# Patient Record
Sex: MALE | Race: WHITE | Employment: FULL TIME | URBAN - METROPOLITAN AREA
[De-identification: names, ages, dates, MRNs, and addresses within clinical notes are randomized per-mention and may not be internally consistent; named-entity substitution may affect disease eponyms.]

---

## 2017-02-08 DIAGNOSIS — E78.5 HYPERLIPIDEMIA, UNSPECIFIED HYPERLIPIDEMIA TYPE: ICD-10-CM

## 2017-02-09 RX ORDER — SIMVASTATIN 20 MG/1
TABLET, FILM COATED ORAL
Qty: 90 TAB | Refills: 0 | Status: SHIPPED | OUTPATIENT
Start: 2017-02-09 | End: 2017-04-26 | Stop reason: SDUPTHER

## 2017-02-09 NOTE — TELEPHONE ENCOUNTER
From: Mo Helton  To:  Devon Madsen MD  Sent: 2/8/2017 5:03 PM EST  Subject: Medication Renewal Request    Original authorizing provider: MD Mo Oh would like a refill of the following medications:  simvastatin (ZOCOR) 20 mg tablet Devon Madsen MD]    Preferred pharmacy: [address] [place] [address]    Comment:

## 2017-04-26 ENCOUNTER — OFFICE VISIT (OUTPATIENT)
Dept: FAMILY MEDICINE CLINIC | Age: 52
End: 2017-04-26

## 2017-04-26 VITALS
TEMPERATURE: 98.5 F | HEART RATE: 80 BPM | DIASTOLIC BLOOD PRESSURE: 78 MMHG | SYSTOLIC BLOOD PRESSURE: 114 MMHG | HEIGHT: 69 IN | WEIGHT: 225.8 LBS | OXYGEN SATURATION: 98 % | BODY MASS INDEX: 33.44 KG/M2 | RESPIRATION RATE: 18 BRPM

## 2017-04-26 DIAGNOSIS — J30.1 SEASONAL ALLERGIC RHINITIS DUE TO POLLEN: ICD-10-CM

## 2017-04-26 DIAGNOSIS — E78.5 HYPERLIPIDEMIA, UNSPECIFIED HYPERLIPIDEMIA TYPE: ICD-10-CM

## 2017-04-26 DIAGNOSIS — E55.9 VITAMIN D DEFICIENCY: ICD-10-CM

## 2017-04-26 DIAGNOSIS — K21.9 GASTROESOPHAGEAL REFLUX DISEASE WITHOUT ESOPHAGITIS: ICD-10-CM

## 2017-04-26 DIAGNOSIS — J02.9 PHARYNGITIS, UNSPECIFIED ETIOLOGY: Primary | ICD-10-CM

## 2017-04-26 RX ORDER — SIMVASTATIN 20 MG/1
TABLET, FILM COATED ORAL
Qty: 90 TAB | Refills: 1 | Status: SHIPPED | OUTPATIENT
Start: 2017-04-26 | End: 2017-11-20 | Stop reason: SDUPTHER

## 2017-04-26 NOTE — PROGRESS NOTES
Chief Complaint   Patient presents with    Cholesterol Problem     fasting today     1. Have you been to the ER, urgent care clinic since your last visit? Hospitalized since your last visit? No    2. Have you seen or consulted any other health care providers outside of the [address] [place] [address] since your last visit? Include any pap smears or colon screening.  No

## 2017-04-26 NOTE — PROGRESS NOTES
HISTORY OF PRESENT ILLNESS  HPI  Emil Souza is a 46 y.o. Male with history of vitamin D deficiency and hyperlipidemia who presents to office today for fasting follow-up. Pt complains of a sore throat after waking up for the past 3-4 months. He notes that he will also occasionally wake up with a dry mouth. Pt states that he had problems with allergies in the spring 3-4 years ago but denies other incidences of allergies. Pt complains of heartburn up to 2-3 times a week that is most common after lying down in bed and is aggravated by eating close to bedtime. Past Medical History:   Diagnosis Date    Other and unspecified hyperlipidemia 11/01/2010    Other and unspecified hyperlipidemia 11/1/2010     Past Surgical History:   Procedure Laterality Date    HX OTHER SURGICAL  1998    anal fissure     Current Outpatient Prescriptions on File Prior to Visit   Medication Sig Dispense Refill    multivitamin, stress formula (STRESS TAB) tablet Take 1 Tab by mouth daily.  omega-3 fatty acids-vitamin e (FISH OIL) 1,000 mg Cap Take 4 Caps by mouth.  cholecalciferol, vitamin D3, (VITAMIN D3) 2,000 unit Tab Take 8,000 Units by mouth. No current facility-administered medications on file prior to visit.       No Known Allergies  Family History   Problem Relation Age of Onset    Cancer Mother      Social History     Social History    Marital status: UNKNOWN     Spouse name: N/A    Number of children: N/A    Years of education: N/A     Social History Main Topics    Smoking status: Never Smoker    Smokeless tobacco: Never Used    Alcohol use Yes    Drug use: No    Sexual activity: Not Asked     Other Topics Concern     Service Yes    Blood Transfusions No    Caffeine Concern No    Occupational Exposure No    Hobby Hazards No    Sleep Concern No    Stress Concern No    Weight Concern No    Special Diet No    Back Care No    Exercise Yes    Bike Helmet Not Asked     na    Seat Belt Yes    Self-Exams No     Social History Narrative             Review of Systems   Constitutional: Negative for chills, diaphoresis, fever, malaise/fatigue and weight loss. HENT: Positive for congestion and sore throat (morning). Eyes: Negative for blurred vision, double vision, pain and redness. Respiratory: Negative for cough, shortness of breath and wheezing. Cardiovascular: Negative for chest pain, palpitations, orthopnea, claudication, leg swelling and PND. Gastrointestinal: Positive for heartburn. Skin: Negative for itching and rash. Neurological: Negative for dizziness, tingling, tremors, sensory change, speech change, focal weakness, seizures, loss of consciousness, weakness and headaches. Results for orders placed or performed in visit on 04/26/17   LIPID PANEL   Result Value Ref Range    Cholesterol, total 182 100 - 199 mg/dL    Triglyceride 107 0 - 149 mg/dL    HDL Cholesterol 50 >39 mg/dL    VLDL, calculated 21 5 - 40 mg/dL    LDL, calculated 111 (H) 0 - 99 mg/dL   METABOLIC PANEL, COMPREHENSIVE   Result Value Ref Range    Glucose 103 (H) 65 - 99 mg/dL    BUN 21 6 - 24 mg/dL    Creatinine 1.03 0.76 - 1.27 mg/dL    GFR est non-AA 84 >59 mL/min/1.73    GFR est AA 97 >59 mL/min/1.73    BUN/Creatinine ratio 20 9 - 20    Sodium 142 134 - 144 mmol/L    Potassium 4.5 3.5 - 5.2 mmol/L    Chloride 105 96 - 106 mmol/L    CO2 20 18 - 29 mmol/L    Calcium 9.0 8.7 - 10.2 mg/dL    Protein, total 7.1 6.0 - 8.5 g/dL    Albumin 4.6 3.5 - 5.5 g/dL    GLOBULIN, TOTAL 2.5 1.5 - 4.5 g/dL    A-G Ratio 1.8 1.2 - 2.2    Bilirubin, total 0.4 0.0 - 1.2 mg/dL    Alk.  phosphatase 56 39 - 117 IU/L    AST (SGOT) 14 0 - 40 IU/L    ALT (SGPT) 31 0 - 44 IU/L   CVD REPORT   Result Value Ref Range    INTERPRETATION Note              Physical Exam  Visit Vitals    /78 (BP 1 Location: Right arm, BP Patient Position: Sitting)    Pulse 80    Temp 98.5 °F (36.9 °C) (Oral)    Resp 18    Ht 5' 8.5\" (1.74 m)  Wt 225 lb 12.8 oz (102.4 kg)    SpO2 98%    BMI 33.83 kg/m2     Head: Normocephalic, without obvious abnormality, atraumatic  Eyes: conjunctivae/corneas clear. PERRL, EOM's intact. Ears: normal TM's and external ear canals AU  Nose: Nares normal. Septum midline. mild to moderate nasal congestion, a little bit of watery drainage but no thick drainage  Throat: Lips, mucosa, and tongue normal. Teeth and gums normal  Neck: supple, symmetrical, trachea midline, no adenopathy, thyroid: not enlarged, symmetric, no tenderness/mass/nodules, no carotid bruit and no JVD  Lungs: clear to auscultation bilaterally  Heart: regular rate and rhythm, S1, S2 normal, no murmur, click, rub or gallop  Extremities: extremities normal, atraumatic, no cyanosis or edema  Pulses: 2+ and symmetric  Lymph nodes: Cervical, supraclavicular, and axillary nodes normal.  Neurologic: Grossly normal            ASSESSMENT and PLAN    ICD-10-CM ICD-9-CM    1. Pharyngitis, unspecified etiology- Am for months- reflux vs allergy- 4/2017 J02.9 462    2. Hyperlipidemia, unspecified hyperlipidemia type E78.5 272.4 simvastatin (ZOCOR) 20 mg tablet      LIPID PANEL      METABOLIC PANEL, COMPREHENSIVE      MO HANDLG&/OR CONVEY OF SPEC FOR TR OFFICE TO LAB      MO COLLECTION VENOUS BLOOD,VENIPUNCTURE   3. Vitamin D deficiency E55.9 268.9    4. Seasonal allergic rhinitis due to pollen J30.1 477.0    5. Gastroesophageal reflux disease without esophagitis- mainly at bedtime K21.9 530.81      Candivenus Peralta was seen today for cholesterol problem.     Diagnoses and all orders for this visit:    Pharyngitis, unspecified etiology- Am for months- reflux vs allergy- 4/2017    Hyperlipidemia, unspecified hyperlipidemia type  -     simvastatin (ZOCOR) 20 mg tablet; TAKE 1 TABLET BY MOUTH NIGHTLY  -     LIPID PANEL  -     METABOLIC PANEL, COMPREHENSIVE  -     MO HANDLG&/OR CONVEY OF SPEC FOR TR OFFICE TO LAB  -     MO COLLECTION VENOUS BLOOD,VENIPUNCTURE    Vitamin D deficiency    Seasonal allergic rhinitis due to pollen    Gastroesophageal reflux disease without esophagitis- mainly at bedtime    Other orders  -     CVD REPORT      Follow-up Disposition:  Return in about 6 months (around 10/26/2017), or if Am sore throat symptoms worsen or fail to improve, for F/U cholesterol, f/u Vitamin D deficiency. lab results and schedule of future lab studies reviewed with patient  reviewed diet, exercise and weight control  cardiovascular risk and specific lipid/LDL goals reviewed  reviewed medications and side effects in detail  Please call my office if there are any questions- [phone]. I will arrange for follow up after the lab tests done from today return  Recommended a weekly \"heart check. \" I went into detail how to do this. Call for refills on medications as needed. Discussed expected course/resolution/complications of diagnosis in detail with patient. Medication risks/benefits/costs/interactions/alternatives discussed with patient. Pt was given an after visit summary which includes diagnoses, current medications & vitals. Pt expressed understanding with the diagnosis and plan. Total 25 minutes,60 % counseling re: I reviewed the importance of doing a weekly \"heart check\" and how to do that; he apparently is doing this most of the time. I talked briefly about the cardiac risk calculation, and we'll calculate that for him as well. He's aware of his weight going up and that he needs to work on that. Reviewed symptoms, or lack thereof, of elevated cholesterol. I suggested that he give a trial of Claritin or Allegra daily for three weeks; if his sore throat gets better it means it's most likely from an allergy, and if it doesn't improve he's probably having reflux causing his sore throat.  I informed him that patients with reflux don't always have the typical heartburn or burping; they can just have a sore throat or a cough, so if his sore throat does appear to be coming from reflux, he needs to start treating that if his symptom is happening more than twice a week. I mentioned Pepcid as a treatment and preventative, whereas taking Tums after the symptom is not preventative. Also, discussed symptoms of concern that were noted today in the note above, treatment options( including doing nothing), when to follow up before recommended time frame. Also, answered all questions. This document was written by Vianca Lopes, as dictated by Tera Cochran MD.  I have reviewed and agree with the above note and have made corrections where appropriate Len Nelson M.D.

## 2017-04-26 NOTE — PATIENT INSTRUCTIONS

## 2017-04-26 NOTE — MR AVS SNAPSHOT
Visit Information Date & Time Provider Department Dept. Phone Encounter #  
 4/26/2017  8:30 AM Humaira Olmos  [facility] [phone] [id number] Upcoming Health Maintenance Date Due INFLUENZA AGE 9 TO ADULT 8/1/2016 DTaP/Tdap/Td series (2 - Td) 10/27/2020 COLONOSCOPY 12/17/2020 Allergies as of 4/26/2017  Review Complete On: 4/26/2017 By: Karmen Iniguez LPN No Known Allergies Current Immunizations  Never Reviewed Name Date Influenza Vaccine 9/6/2015 TDAP Vaccine 10/27/2010 Not reviewed this visit You Were Diagnosed With   
  
 Codes Comments Hyperlipidemia, unspecified hyperlipidemia type     ICD-10-CM: E78.5 ICD-9-CM: 272.4 Vitals BP Pulse Temp Resp Height(growth percentile) Weight(growth percentile) 114/78 (BP 1 Location: Right arm, BP Patient Position: Sitting) 80 98.5 °F (36.9 °C) (Oral) 18 5' 8.5\" (1.74 m) 225 lb 12.8 oz (102.4 kg) SpO2 BMI Smoking Status 98% 33.83 kg/m2 Never Smoker Vitals History BMI and BSA Data Body Mass Index Body Surface Area  
 33.83 kg/m 2 2.22 m 2 Preferred Pharmacy Pharmacy Name Phone [address] [place] [address], [facility] Magalis Sutherland [phone] Your Updated Medication List  
  
   
This list is accurate as of: 4/26/17  9:29 AM.  Always use your most recent med list.  
  
  
  
  
 FISH OIL 1,000 mg Cap Generic drug:  omega-3 fatty acids-vitamin e Take 4 Caps by mouth.  
  
 multivitamin, stress formula tablet Commonly known as:  STRESS TAB Take 1 Tab by mouth daily. simvastatin 20 mg tablet Commonly known as:  ZOCOR  
TAKE 1 TABLET BY MOUTH NIGHTLY  
  
 VITAMIN D3 2,000 unit Tab Generic drug:  cholecalciferol (vitamin D3) Take 8,000 Units by mouth. Prescriptions Sent to Pharmacy Refills  
 simvastatin (ZOCOR) 20 mg tablet 1 Sig: TAKE 1 TABLET BY MOUTH NIGHTLY Class: Normal  
 Pharmacy: [id number]  #: [phone] We Performed the Following LIPID PANEL [18703 CPT(R)] METABOLIC PANEL, COMPREHENSIVE [73242 CPT(R)] CO COLLECTION VENOUS BLOOD,VENIPUNCTURE [id number] CPT(R)] CO HANDLG&/OR CONVEY OF SPEC FOR TR OFFICE TO LAB [09655 CPT(R)] Patient Instructions High Cholesterol: Care Instructions Your Care Instructions Cholesterol is a type of fat in your blood. It is needed for many body functions, such as making new cells. Cholesterol is made by your body. It also comes from food you eat. High cholesterol means that you have too much of the fat in your blood. This raises your risk of a heart attack and stroke. LDL and HDL are part of your total cholesterol. LDL is the \"bad\" cholesterol. High LDL can raise your risk for heart disease, heart attack, and stroke. HDL is the \"good\" cholesterol. It helps clear bad cholesterol from the body. High HDL is linked with a lower risk of heart disease, heart attack, and stroke. Your cholesterol levels help your doctor find out your risk for having a heart attack or stroke. You and your doctor can talk about whether you need to lower your risk and what treatment is best for you. A heart-healthy lifestyle along with medicines can help lower your cholesterol and your risk. The way you choose to lower your risk will depend on how high your risk is for heart attack and stroke. It will also depend on how you feel about taking medicines. Follow-up care is a key part of your treatment and safety. Be sure to make and go to all appointments, and call your doctor if you are having problems. It's also a good idea to know your test results and keep a list of the medicines you take. How can you care for yourself at home? · Eat a variety of foods every day.  Good choices include fruits, vegetables, whole grains (like oatmeal), dried beans and peas, nuts and seeds, soy products (like tofu), and fat-free or low-fat dairy products. · Replace butter, margarine, and hydrogenated or partially hydrogenated oils with olive and canola oils. (Canola oil margarine without trans fat is fine.) · Replace red meat with fish, poultry, and soy protein (like tofu). · Limit processed and packaged foods like chips, crackers, and cookies. · Bake, broil, or steam foods. Don't castrejon them. · Be physically active. Get at least 30 minutes of exercise on most days of the week. Walking is a good choice. You also may want to do other activities, such as running, swimming, cycling, or playing tennis or team sports. · Stay at a healthy weight or lose weight by making the changes in eating and physical activity listed above. Losing just a small amount of weight, even 5 to 10 pounds, can reduce your risk for having a heart attack or stroke. · Do not smoke. When should you call for help? Watch closely for changes in your health, and be sure to contact your doctor if: 
· You need help making lifestyle changes. · You have questions about your medicine. Where can you learn more? Go to http://carson-pb.info/. Enter E029 in the search box to learn more about \"High Cholesterol: Care Instructions. \" Current as of: January 27, 2016 Content Version: 11.2 © 3804-1404 [employer]. Care instructions adapted under license by [employer] (which disclaims liability or warranty for this information). If you have questions about a medical condition or this instruction, always ask your healthcare professional. [place] [address] any warranty or liability for your use of this information. Introducing [facility]! Dear Perfecto Blow: Thank you for requesting a [employer] account. Our records indicate that you already have an active [employer] account. You can access your account anytime at [email or web address] Did you know that you can access your hospital and ER discharge instructions at any time in [employer]? You can also review all of your test results from your hospital stay or ER visit. Additional Information If you have questions, please visit the Frequently Asked Questions section of the [employer] website at [email or web address] [email or web address] Remember, [employer] is NOT to be used for urgent needs. For medical emergencies, dial 911. Now available from your iPhone and Android! Please provide this summary of care documentation to your next provider. Your primary care clinician is listed as [address] [place] [address], [facility]/s . If you have any questions after today's visit, please call [phone].

## 2017-04-27 LAB
ALBUMIN SERPL-MCNC: 4.6 G/DL (ref 3.5–5.5)
ALBUMIN/GLOB SERPL: 1.8 {RATIO} (ref 1.2–2.2)
ALP SERPL-CCNC: 56 IU/L (ref 39–117)
ALT SERPL-CCNC: 31 IU/L (ref 0–44)
AST SERPL-CCNC: 14 IU/L (ref 0–40)
BILIRUB SERPL-MCNC: 0.4 MG/DL (ref 0–1.2)
BUN SERPL-MCNC: 21 MG/DL (ref 6–24)
BUN/CREAT SERPL: 20 (ref 9–20)
CALCIUM SERPL-MCNC: 9 MG/DL (ref 8.7–10.2)
CHLORIDE SERPL-SCNC: 105 MMOL/L (ref 96–106)
CHOLEST SERPL-MCNC: 182 MG/DL (ref 100–199)
CO2 SERPL-SCNC: 20 MMOL/L (ref 18–29)
CREAT SERPL-MCNC: 1.03 MG/DL (ref 0.76–1.27)
GLOBULIN SER CALC-MCNC: 2.5 G/DL (ref 1.5–4.5)
GLUCOSE SERPL-MCNC: 103 MG/DL (ref 65–99)
HDLC SERPL-MCNC: 50 MG/DL
INTERPRETATION, 910389: NORMAL
LDLC SERPL CALC-MCNC: 111 MG/DL (ref 0–99)
POTASSIUM SERPL-SCNC: 4.5 MMOL/L (ref 3.5–5.2)
PROT SERPL-MCNC: 7.1 G/DL (ref 6–8.5)
SODIUM SERPL-SCNC: 142 MMOL/L (ref 134–144)
TRIGL SERPL-MCNC: 107 MG/DL (ref 0–149)
VLDLC SERPL CALC-MCNC: 21 MG/DL (ref 5–40)

## 2017-11-20 ENCOUNTER — OFFICE VISIT (OUTPATIENT)
Dept: FAMILY MEDICINE CLINIC | Age: 52
End: 2017-11-20

## 2017-11-20 VITALS
DIASTOLIC BLOOD PRESSURE: 86 MMHG | HEART RATE: 95 BPM | WEIGHT: 232.8 LBS | SYSTOLIC BLOOD PRESSURE: 132 MMHG | RESPIRATION RATE: 18 BRPM | HEIGHT: 69 IN | BODY MASS INDEX: 34.48 KG/M2 | OXYGEN SATURATION: 95 % | TEMPERATURE: 98 F

## 2017-11-20 DIAGNOSIS — Z00.00 ROUTINE PHYSICAL EXAMINATION: Primary | ICD-10-CM

## 2017-11-20 DIAGNOSIS — E78.5 HYPERLIPIDEMIA WITH TARGET LDL LESS THAN 130: ICD-10-CM

## 2017-11-20 DIAGNOSIS — Z23 ENCOUNTER FOR IMMUNIZATION: ICD-10-CM

## 2017-11-20 DIAGNOSIS — E55.9 VITAMIN D DEFICIENCY: ICD-10-CM

## 2017-11-20 RX ORDER — SIMVASTATIN 20 MG/1
TABLET, FILM COATED ORAL
Qty: 90 TAB | Refills: 1 | Status: SHIPPED | COMMUNITY
Start: 2017-11-20 | End: 2018-05-20 | Stop reason: SDUPTHER

## 2017-11-20 NOTE — LETTER
12/26/2017 9:42 AM 
 
Mr. Mo FLORES. [id number] Dear Mo Helton: 
 
Please find your most recent results below. Resulted Orders METABOLIC PANEL, COMPREHENSIVE Result Value Ref Range Glucose 102 (H) 65 - 99 mg/dL BUN 18 6 - 24 mg/dL Creatinine 1.00 0.76 - 1.27 mg/dL GFR est non-AA 86 >59 mL/min/1.73 GFR est  >59 mL/min/1.73  
 BUN/Creatinine ratio 18 9 - 20 Sodium 139 134 - 144 mmol/L Potassium 4.6 3.5 - 5.2 mmol/L Chloride 103 96 - 106 mmol/L  
 CO2 20 18 - 29 mmol/L Calcium 8.8 8.7 - 10.2 mg/dL Protein, total 6.7 6.0 - 8.5 g/dL Albumin 4.3 3.5 - 5.5 g/dL GLOBULIN, TOTAL 2.4 1.5 - 4.5 g/dL A-G Ratio 1.8 1.2 - 2.2 Bilirubin, total 0.4 0.0 - 1.2 mg/dL Alk. phosphatase 69 39 - 117 IU/L  
 AST (SGOT) 21 0 - 40 IU/L  
 ALT (SGPT) 41 0 - 44 IU/L Narrative Performed at:  [address]  [id number] : Merly Grant MD, Phone:  [phone] LIPID PANEL Result Value Ref Range Cholesterol, total 193 100 - 199 mg/dL Triglyceride 202 (H) 0 - 149 mg/dL HDL Cholesterol 48 >39 mg/dL VLDL, calculated 40 5 - 40 mg/dL LDL, calculated 105 (H) 0 - 99 mg/dL Narrative Performed at:  [address]  [id number] : Merly Grant MD, Phone:  [phone] VITAMIN D, 25 HYDROXY Result Value Ref Range VITAMIN D, 25-HYDROXY 65.8 30.0 - 100.0 ng/mL Comment:  
   Vitamin D deficiency has been defined by the [facility] practice guideline as a 
level of serum 25-OH vitamin D less than 20 ng/mL (1,2). The Endocrine Society went on to further define vitamin D 
insufficiency as a level between 21 and 29 ng/mL (2). 1. IOM ([place] of [employer]). 2010. Dietary reference 
   intakes for calcium and D. [facility]: The 
   [employer]. 2. Cristy MF, Aakash NC, Dora MOORE, et al. 
   Evaluation, treatment, and prevention of vitamin D 
   deficiency: an Endocrine Society clinical practice 
   guideline. JCEM. 2011 Jul; 96(7):1911-30. Narrative Performed at:  [address]  [id number] : Fercho Singer MD, Phone:  [phone] URINALYSIS W/MICROSCOPIC Result Value Ref Range Specific Gravity 1.023 1.005 - 1.030  
 pH (UA) 6.0 5.0 - 7.5 Color Yellow Yellow Appearance Clear Clear Leukocyte Esterase Negative Negative Protein Negative Negative/Trace Glucose Negative Negative Ketone Negative Negative Blood Negative Negative Bilirubin Negative Negative Urobilinogen 0.2 0.2 - 1.0 mg/dL Nitrites Negative Negative Microscopic Examination Comment Comment:  
   Microscopic follows if indicated. Microscopic exam See additional order Comment:  
   Microscopic was indicated and was performed. Narrative Performed at:  [address]  [id number] : Fercho Singer MD, Phone:  [phone] PSA W/ REFLX FREE PSA Result Value Ref Range Prostate Specific Ag 1.6 0.0 - 4.0 ng/mL Comment:  
   Roche ECLIA methodology. According to the American Urological Association, Serum PSA should 
decrease and remain at undetectable levels after radical 
prostatectomy. The AUA defines biochemical recurrence as an initial 
PSA value 0.2 ng/mL or greater followed by a subsequent confirmatory PSA value 0.2 ng/mL or greater. Values obtained with different assay methods or kits cannot be used 
interchangeably. Results cannot be interpreted as absolute evidence 
of the presence or absence of malignant disease. Reflex Criteria Comment Comment:  
   The percent free PSA is performed on a reflex basis only when the 
total PSA is between 4.0 and 10.0 ng/mL. Narrative Performed at:  [address]  [id number] : Fercho Singer MD, Phone:  [phone] MICROSCOPIC EXAMINATION Result Value Ref Range WBC 0-5 0 - 5 /hpf  
 RBC 0-2 0 - 2 /hpf Epithelial cells 0-10 0 - 10 /hpf Casts None seen None seen /lpf Mucus Present Not Estab. Bacteria None seen None seen/Few Narrative Performed at:  [address]  [id number] : Fercho Singer MD, Phone:  [phone] CVD REPORT Result Value Ref Range INTERPRETATION Note Comment:  
   Supplemental report is available. Narrative Performed at:  [address] [place] [address]  [id number] : Alex Strauss PhD, Phone:  [phone] RECOMMENDATIONS: 
Good news! ! As you can see above, your lab tests done recently at your physical all came back normal or are at goal; no signs of diabetes, excellent cholesterol levels, normal liver and kidney function. Continue to follow up every 6 months a fasting office visit for your cholesterol monitoring.  
   I would recommend that you follow up for a full physical every 2 years until the age of 61, and then every year. The years that you are not having a physical, you will need a short appointment for a prostate exam ; your PSA (prostate cancer check) also came back normal.  
 
Please call me if you have any questions: [phone] Sincerely, 
 
 
Leydi Gaines MD

## 2017-11-20 NOTE — PROGRESS NOTES
\"Reviewed record in preparation for visit and have obtained the necessary documentation\"  Chief Complaint   Patient presents with    Complete Physical     1. Have you been to the ER, urgent care clinic since your last visit? Hospitalized since your last visit? No    2. Have you seen or consulted any other health care providers outside of the [address] [place] [address] since your last visit? Include any pap smears or colon screening. No      Patient given flu vaccine in R deltoid. Consent has been signed and will be scanned in chart. Immunization handout given. Patient will call with any adverse reactions.

## 2017-11-20 NOTE — PATIENT INSTRUCTIONS

## 2017-11-20 NOTE — MR AVS SNAPSHOT
Visit Information Date & Time Provider Department Dept. Phone Encounter #  
 11/20/2017  9:00 AM Juan Darling MD [facility] [phone] [id number] Follow-up Instructions Return in about 6 months (around 5/20/2018) for F/U cholesterol, f/u Vitamin D deficiency. Follow-up and Disposition History Upcoming Health Maintenance Date Due Influenza Age 5 to Adult 8/1/2017 DTaP/Tdap/Td series (2 - Td) 10/27/2020 COLONOSCOPY 12/17/2020 Allergies as of 11/20/2017  Review Complete On: 11/20/2017 By: Juan Darling MD  
 No Known Allergies Current Immunizations  Never Reviewed Name Date Influenza Vaccine 9/6/2015 Influenza Vaccine (Quad) PF 11/20/2017 TDAP Vaccine 10/27/2010 Not reviewed this visit You Were Diagnosed With   
  
 Codes Comments Routine physical examination    -  Primary ICD-10-CM: Z00.00 ICD-9-CM: V70.0 Vitamin D deficiency     ICD-10-CM: E55.9 ICD-9-CM: 268.9 Encounter for immunization     ICD-10-CM: K63 ICD-9-CM: V03.89 Hyperlipidemia with target LDL less than 130     ICD-10-CM: E78.5 ICD-9-CM: 272.4 Vitals BP Pulse Temp Resp Height(growth percentile) Weight(growth percentile) 132/86 (BP 1 Location: Left arm, BP Patient Position: Sitting) 95 98 °F (36.7 °C) (Oral) 18 5' 8.5\" (1.74 m) 232 lb 12.8 oz (105.6 kg) SpO2 BMI Smoking Status 95% 34.88 kg/m2 Never Smoker Vitals History BMI and BSA Data Body Mass Index Body Surface Area 34.88 kg/m 2 2.26 m 2 Preferred Pharmacy Pharmacy Name Phone [address] [facility], [facility] Magalis Sutherland [phone] Your Updated Medication List  
  
   
This list is accurate as of: 11/20/17 10:23 AM.  Always use your most recent med list.  
  
  
  
  
 FISH OIL 1,000 mg Cap Generic drug:  omega-3 fatty acids-vitamin e Take 4 Caps by mouth. multivitamin, stress formula tablet Commonly known as:  STRESS TAB Take 1 Tab by mouth daily. simvastatin 20 mg tablet Commonly known as:  ZOCOR  
TAKE 1 TABLET BY MOUTH NIGHTLY  
  
 VITAMIN D3 2,000 unit Tab Generic drug:  cholecalciferol (vitamin D3) Take 8,000 Units by mouth. Prescriptions Sent to Mail Order Refills  
 simvastatin (ZOCOR) 20 mg tablet 1 Sig: TAKE 1 TABLET BY MOUTH NIGHTLY Class: Mail Order Pharmacy: [address] [place] [address] Marcela Martinez  #: [phone] We Performed the Following AMB POC EKG ROUTINE W/ 12 LEADS, INTER & REP [19634 CPT(R)] INFLUENZA VIRUS VAC QUAD,SPLIT,PRESV FREE SYRINGE IM [id number] CPT(R)] LIPID PANEL [51160 CPT(R)] METABOLIC PANEL, COMPREHENSIVE [54306 CPT(R)] PSA W/ REFLX FREE PSA [15430 CPT(R)] URINALYSIS W/MICROSCOPIC [54488 CPT(R)] VITAMIN D, 25 HYDROXY [id number] CPT(R)] Follow-up Instructions Return in about 6 months (around 5/20/2018) for F/U cholesterol, f/u Vitamin D deficiency. Patient Instructions Well Visit, Men 48 to 72: Care Instructions Your Care Instructions Physical exams can help you stay healthy. Your doctor has checked your overall health and may have suggested ways to take good care of yourself. He or she also may have recommended tests. At home, you can help prevent illness with healthy eating, regular exercise, and other steps. Follow-up care is a key part of your treatment and safety. Be sure to make and go to all appointments, and call your doctor if you are having problems. It's also a good idea to know your test results and keep a list of the medicines you take. How can you care for yourself at home? · Reach and stay at a healthy weight. This will lower your risk for many problems, such as obesity, diabetes, heart disease, and high blood pressure. · Get at least 30 minutes of exercise on most days of the week. Walking is a good choice. You also may want to do other activities, such as running, swimming, cycling, or playing tennis or team sports. · Do not smoke. Smoking can make health problems worse. If you need help quitting, talk to your doctor about stop-smoking programs and medicines. These can increase your chances of quitting for good. · Protect your skin from too much sun. When you're outdoors from 10 a.m. to 4 p.m., stay in the shade or cover up with clothing and a hat with a wide brim. Wear sunglasses that block UV rays. Even when it's cloudy, put broad-spectrum sunscreen (SPF 30 or higher) on any exposed skin. · See a dentist one or two times a year for checkups and to have your teeth cleaned. · Wear a seat belt in the car. · Limit alcohol to 2 drinks a day. Too much alcohol can cause health problems. Follow your doctor's advice about when to have certain tests. These tests can spot problems early. · Cholesterol. Your doctor will tell you how often to have this done based on your overall health and other things that can increase your risk for heart attack and stroke. · Blood pressure. Have your blood pressure checked during a routine doctor visit. Your doctor will tell you how often to check your blood pressure based on your age, your blood pressure results, and other factors. · Prostate exam. Talk to your doctor about whether you should have a blood test (called a PSA test) for prostate cancer. Experts disagree on whether men should have this test. Some experts recommend that you discuss the benefits and risks of the test with your doctor. · Diabetes. Ask your doctor whether you should have tests for diabetes. · Vision. Some experts recommend that you have yearly exams for glaucoma and other age-related eye problems starting at age 48. · Hearing. Tell your doctor if you notice any change in your hearing.  You can have tests to find out how well you hear. · Colon cancer. You should begin tests for colon cancer at age 48. You may have one of several tests. Your doctor will tell you how often to have tests based on your age and risk. Risks include whether you already had a precancerous polyp removed from your colon or whether your parent, brother, sister, or child has had colon cancer. · Heart attack and stroke risk. At least every 4 to 6 years, you should have your risk for heart attack and stroke assessed. Your doctor uses factors such as your age, blood pressure, cholesterol, and whether you smoke or have diabetes to show what your risk for a heart attack or stroke is over the next 10 years. · Abdominal aortic aneurysm. Ask your doctor whether you should have a test to check for an aneurysm. You may need a test if you ever smoked or if your parent, brother, sister, or child has had an aneurysm. When should you call for help? Watch closely for changes in your health, and be sure to contact your doctor if you have any problems or symptoms that concern you. Where can you learn more? Go to http://carson-pb.info/. Enter B660 in the search box to learn more about \"Well Visit, Men 48 to 72: Care Instructions. \" Current as of: May 12, 2017 Content Version: 11.4 © 7826-9101 Healthwise, Incorporated. Care instructions adapted under license by here[employer] (which disclaims liability or warranty for this information). If you have questions about a medical condition or this instruction, always ask your healthcare professional. [place] [address] any warranty or liability for your use of this information. Introducing [address]! Dear Lisa Arciniega: Thank you for requesting a [employer] account. Our records indicate that you already have an active [employer] account. You can access your account anytime at [email or web address] Did you know that you can access your hospital and ER discharge instructions at any time in [employer]? You can also review all of your test results from your hospital stay or ER visit. Additional Information If you have questions, please visit the Frequently Asked Questions section of the [employer] website at [email or web address] Remember, [employer] is NOT to be used for urgent needs. For medical emergencies, dial 911. Now available from your iPhone and Android! Please provide this summary of care documentation to your next provider. Your primary care clinician is listed as [place] [address], [facility]/s . If you have any questions after today's visit, please call [phone].

## 2017-11-20 NOTE — PROGRESS NOTES
HISTORY OF PRESENT ILLNESS  HPI  Damir Fernández is a 46 y.o. male with history of vitamin D deficiency and hyperlipidemia who presents to office today for a complete physical. Pt states that he regularly takes his simvastatin and vitamin D. He states that he is not often physically active. He denies problems sleeping, unusual SOB, chest pain, and any recent ER visits or hospitalizations. Pt states he hit his head on a nail in September, breaking the skin and causing bleeding; he notes he was wearing a thick hat at the time. He states he had headaches for up to three weeks afterward, but he denies any currently. He also denies current bleeding from the area. Past Medical History:   Diagnosis Date    Other and unspecified hyperlipidemia 11/01/2010    Other and unspecified hyperlipidemia 11/1/2010     Past Surgical History:   Procedure Laterality Date    HX OTHER SURGICAL  1998    anal fissure     Current Outpatient Prescriptions on File Prior to Visit   Medication Sig Dispense Refill    multivitamin, stress formula (STRESS TAB) tablet Take 1 Tab by mouth daily.  omega-3 fatty acids-vitamin e (FISH OIL) 1,000 mg Cap Take 4 Caps by mouth.  cholecalciferol, vitamin D3, (VITAMIN D3) 2,000 unit Tab Take 8,000 Units by mouth. No current facility-administered medications on file prior to visit.       No Known Allergies  Family History   Problem Relation Age of Onset    Cancer Mother      Social History     Social History    Marital status:      Spouse name: N/A    Number of children: N/A    Years of education: N/A     Social History Main Topics    Smoking status: Never Smoker    Smokeless tobacco: Never Used    Alcohol use Yes    Drug use: No    Sexual activity: Not Asked     Other Topics Concern     Service Yes    Blood Transfusions No    Caffeine Concern No    Occupational Exposure No    Hobby Hazards No    Sleep Concern No    Stress Concern No    Weight Concern No  Special Diet No    Back Care No    Exercise Yes    Bike Helmet Not Asked     na    Seat Belt Yes    Self-Exams No     Social History Narrative           Review of Systems   Constitutional: Negative for chills, diaphoresis, fever, malaise/fatigue and weight loss. HENT: Negative for congestion, ear discharge, ear pain, hearing loss, nosebleeds, sore throat and tinnitus. Eyes: Negative for blurred vision, double vision, photophobia, pain, discharge and redness. Respiratory: Negative for cough, hemoptysis, sputum production, shortness of breath, wheezing and stridor. Cardiovascular: Negative for chest pain, palpitations, orthopnea, claudication, leg swelling and PND. Gastrointestinal: Negative for abdominal pain, blood in stool, constipation, diarrhea, heartburn, melena, nausea and vomiting. Genitourinary: Negative for dysuria, flank pain, frequency, hematuria and urgency. Musculoskeletal: Negative for back pain, falls, joint pain, myalgias and neck pain. Skin: Negative for itching and rash. Neurological: Negative for dizziness, tingling, tremors, sensory change, speech change, focal weakness, seizures, loss of consciousness, weakness and headaches. Endo/Heme/Allergies: Negative for environmental allergies and polydipsia. Does not bruise/bleed easily. Psychiatric/Behavioral: Negative for depression, hallucinations, memory loss, substance abuse and suicidal ideas. The patient is not nervous/anxious and does not have insomnia.       Results for orders placed or performed in visit on 04/26/17   LIPID PANEL   Result Value Ref Range    Cholesterol, total 182 100 - 199 mg/dL    Triglyceride 107 0 - 149 mg/dL    HDL Cholesterol 50 >39 mg/dL    VLDL, calculated 21 5 - 40 mg/dL    LDL, calculated 111 (H) 0 - 99 mg/dL   METABOLIC PANEL, COMPREHENSIVE   Result Value Ref Range    Glucose 103 (H) 65 - 99 mg/dL    BUN 21 6 - 24 mg/dL    Creatinine 1.03 0.76 - 1.27 mg/dL    GFR est non-AA 84 >59 mL/min/1.73    GFR est AA 97 >59 mL/min/1.73    BUN/Creatinine ratio 20 9 - 20    Sodium 142 134 - 144 mmol/L    Potassium 4.5 3.5 - 5.2 mmol/L    Chloride 105 96 - 106 mmol/L    CO2 20 18 - 29 mmol/L    Calcium 9.0 8.7 - 10.2 mg/dL    Protein, total 7.1 6.0 - 8.5 g/dL    Albumin 4.6 3.5 - 5.5 g/dL    GLOBULIN, TOTAL 2.5 1.5 - 4.5 g/dL    A-G Ratio 1.8 1.2 - 2.2    Bilirubin, total 0.4 0.0 - 1.2 mg/dL    Alk. phosphatase 56 39 - 117 IU/L    AST (SGOT) 14 0 - 40 IU/L    ALT (SGPT) 31 0 - 44 IU/L   CVD REPORT   Result Value Ref Range    INTERPRETATION Note              Physical Exam  Visit Vitals    /86 (BP 1 Location: Left arm, BP Patient Position: Sitting)    Pulse 95    Temp 98 °F (36.7 °C) (Oral)    Resp 18    Ht 5' 8.5\" (1.74 m)    Wt 232 lb 12.8 oz (105.6 kg)    SpO2 95%    BMI 34.88 kg/m2     General:  Alert, cooperative, no distress, appears stated age. Head:  Normocephalic, without obvious abnormality, atraumatic. Eyes:  Conjunctivae/corneas clear. PERRL, EOMs intact. Fundi benign   Ears:  Normal TMs and external ear canals both ears. Nose: Nares normal. Septum midline. Mucosa normal. No drainage or sinus tenderness. Throat: Lips, mucosa, and tongue normal. Teeth and gums normal.   Neck: Supple, symmetrical, trachea midline, no adenopathy, thyroid: no enlargement/tenderness/nodules, no carotid bruit and no JVD. Back:   Symmetric, no curvature. ROM normal. No CVA tenderness. Lungs:   Clear to auscultation bilaterally. Chest wall:  No tenderness or deformity. Heart:  Regular rate and rhythm, S1, S2 normal, no murmur, click, rub or gallop. Abdomen:   Soft, non-tender. Bowel sounds normal. No masses,  No organomegaly. Genitalia:  Normal male without lesion, discharge or tenderness. Rectal:  Normal tone. Prostate 1+ enlarged, non-tender without masses   Extremities: Extremities normal, atraumatic, no cyanosis or edema. Pulses: 2+ and symmetric all extremities.    Skin: Skin color, texture, turgor normal. No rashes or lesions   Lymph nodes: Cervical, supraclavicular, and axillary nodes normal.   Neurologic: CNII-XII intact. Normal strength, sensation and reflexes throughout. ASSESSMENT and PLAN    ICD-10-CM ICD-9-CM    1. Routine physical examination V27.78 G60.4 METABOLIC PANEL, COMPREHENSIVE      LIPID PANEL      VITAMIN D, 25 HYDROXY      URINALYSIS W/MICROSCOPIC      PSA W/ REFLX FREE PSA      AMB POC EKG ROUTINE W/ 12 LEADS, INTER & REP   2. Vitamin D deficiency E55.9 268.9    3. Encounter for immunization Z23 V03.89 INFLUENZA VIRUS VAC QUAD,SPLIT,PRESV FREE SYRINGE IM   4. Hyperlipidemia with target LDL less than 130 E78.5 272.4 LIPID PANEL      simvastatin (ZOCOR) 20 mg tablet     Diagnoses and all orders for this visit:    1. Routine physical examination  -     METABOLIC PANEL, COMPREHENSIVE  -     LIPID PANEL  -     VITAMIN D, 25 HYDROXY  -     URINALYSIS W/MICROSCOPIC  -     PSA W/ REFLX FREE PSA  -     AMB POC EKG ROUTINE W/ 12 LEADS, INTER & REP    2. Vitamin D deficiency    3. Encounter for immunization  -     Influenza virus vaccine (QUADRIVALENT PRES FREE SYRINGE) IM (40699)    4. Hyperlipidemia with target LDL less than 130  -     LIPID PANEL  -     simvastatin (ZOCOR) 20 mg tablet; TAKE 1 TABLET BY MOUTH NIGHTLY      Follow-up Disposition:  Return in about 6 months (around 5/20/2018) for F/U cholesterol, f/u Vitamin D deficiency. lab results and schedule of future lab studies reviewed with patient  reviewed diet, exercise and weight control  cardiovascular risk and specific lipid/LDL goals reviewed  reviewed medications and side effects in detail  Please call my office if there are any questions- [phone]. I will arrange for follow up after the lab tests done from today return  Recommended a weekly \"heart check. \" I went into detail how to do this. Call for refills on medications as needed.   Discussed expected course/resolution/complications of diagnosis in detail with patient. Medication risks/benefits/costs/interactions/alternatives discussed with patient. Pt was given an after visit summary which includes diagnoses, current medications & vitals. Pt expressed understanding with the diagnosis and plan    I encouraged pt to do a weekly \"heart check\". He's aware of his weight gain, and we talked about the importance of working on that. I informed him that his tetanus is up to date, but he would need a booster if he has a new wound between now and 2020. I mentioned the shingles vaccine and that it's an option; he might want to see if that's covered by his insurance, and he can obtain that through his pharmacy. Note made that his colonoscopy was done in 2010. This document was written by Claudio Dudley, as dictated by Dante Naqvi MD.  I have reviewed and agree with the above note and have made corrections where appropriate Len Guillory M.D.

## 2017-11-21 LAB
25(OH)D3+25(OH)D2 SERPL-MCNC: 65.8 NG/ML (ref 30–100)
ALBUMIN SERPL-MCNC: 4.3 G/DL (ref 3.5–5.5)
ALBUMIN/GLOB SERPL: 1.8 {RATIO} (ref 1.2–2.2)
ALP SERPL-CCNC: 69 IU/L (ref 39–117)
ALT SERPL-CCNC: 41 IU/L (ref 0–44)
APPEARANCE UR: CLEAR
AST SERPL-CCNC: 21 IU/L (ref 0–40)
BACTERIA #/AREA URNS HPF: NORMAL /[HPF]
BILIRUB SERPL-MCNC: 0.4 MG/DL (ref 0–1.2)
BILIRUB UR QL STRIP: NEGATIVE
BUN SERPL-MCNC: 18 MG/DL (ref 6–24)
BUN/CREAT SERPL: 18 (ref 9–20)
CALCIUM SERPL-MCNC: 8.8 MG/DL (ref 8.7–10.2)
CASTS URNS QL MICRO: NORMAL /LPF
CHLORIDE SERPL-SCNC: 103 MMOL/L (ref 96–106)
CHOLEST SERPL-MCNC: 193 MG/DL (ref 100–199)
CO2 SERPL-SCNC: 20 MMOL/L (ref 18–29)
COLOR UR: YELLOW
CREAT SERPL-MCNC: 1 MG/DL (ref 0.76–1.27)
EPI CELLS #/AREA URNS HPF: NORMAL /HPF
GFR SERPLBLD CREATININE-BSD FMLA CKD-EPI: 100 ML/MIN/1.73
GFR SERPLBLD CREATININE-BSD FMLA CKD-EPI: 86 ML/MIN/1.73
GLOBULIN SER CALC-MCNC: 2.4 G/DL (ref 1.5–4.5)
GLUCOSE SERPL-MCNC: 102 MG/DL (ref 65–99)
GLUCOSE UR QL: NEGATIVE
HDLC SERPL-MCNC: 48 MG/DL
HGB UR QL STRIP: NEGATIVE
INTERPRETATION, 910389: NORMAL
KETONES UR QL STRIP: NEGATIVE
LDLC SERPL CALC-MCNC: 105 MG/DL (ref 0–99)
LEUKOCYTE ESTERASE UR QL STRIP: NEGATIVE
MICRO URNS: NORMAL
MICRO URNS: NORMAL
MUCOUS THREADS URNS QL MICRO: PRESENT
NITRITE UR QL STRIP: NEGATIVE
PH UR STRIP: 6 [PH] (ref 5–7.5)
POTASSIUM SERPL-SCNC: 4.6 MMOL/L (ref 3.5–5.2)
PROT SERPL-MCNC: 6.7 G/DL (ref 6–8.5)
PROT UR QL STRIP: NEGATIVE
PSA SERPL-MCNC: 1.6 NG/ML (ref 0–4)
RBC #/AREA URNS HPF: NORMAL /HPF
REFLEX CRITERIA: NORMAL
SODIUM SERPL-SCNC: 139 MMOL/L (ref 134–144)
SP GR UR: 1.02 (ref 1–1.03)
TRIGL SERPL-MCNC: 202 MG/DL (ref 0–149)
UROBILINOGEN UR STRIP-MCNC: 0.2 MG/DL (ref 0.2–1)
VLDLC SERPL CALC-MCNC: 40 MG/DL (ref 5–40)
WBC #/AREA URNS HPF: NORMAL /HPF

## 2017-12-26 NOTE — PROGRESS NOTES
Good news! ! As you can see above, your lab tests done recently at your physical all came back normal or are at goal; no signs of diabetes, excellent cholesterol levels, normal liver and kidney function. Continue to follow up every 6 months a fasting office visit for your cholesterol monitoring. I would recommend that you follow up for a full physical every 2 years until the age of 61, and then every year.  The years that you are not having a physical, you will need a short appointment for a prostate exam ; your PSA (prostate cancer check) also came back normal.

## 2018-05-20 DIAGNOSIS — E78.5 HYPERLIPIDEMIA WITH TARGET LDL LESS THAN 130: ICD-10-CM

## 2018-05-21 RX ORDER — SIMVASTATIN 20 MG/1
TABLET, FILM COATED ORAL
Qty: 90 TAB | Refills: 0 | Status: SHIPPED | OUTPATIENT
Start: 2018-05-21 | End: 2018-08-14 | Stop reason: SDUPTHER

## 2018-05-21 RX ORDER — SIMVASTATIN 20 MG/1
TABLET, FILM COATED ORAL
Qty: 90 TAB | Refills: 0 | OUTPATIENT
Start: 2018-05-21

## 2018-05-21 NOTE — TELEPHONE ENCOUNTER
From: Lea Alaniz  To:  Sharon Shoemaker MD  Sent: 5/20/2018 9:35 AM EDT  Subject: Medication Renewal Request    Original authorizing provider: MD Lea Kelley would like a refill of the following medications:  simvastatin (ZOCOR) 20 mg tablet Sharon Shoemaker MD]    Preferred pharmacy: [address] [place] [address]    Comment:

## 2018-06-12 ENCOUNTER — OFFICE VISIT (OUTPATIENT)
Dept: FAMILY MEDICINE CLINIC | Age: 53
End: 2018-06-12

## 2018-06-12 VITALS
OXYGEN SATURATION: 95 % | DIASTOLIC BLOOD PRESSURE: 78 MMHG | WEIGHT: 233 LBS | BODY MASS INDEX: 34.51 KG/M2 | TEMPERATURE: 98.2 F | RESPIRATION RATE: 18 BRPM | HEART RATE: 89 BPM | SYSTOLIC BLOOD PRESSURE: 132 MMHG | HEIGHT: 69 IN

## 2018-06-12 DIAGNOSIS — E66.9 OBESITY (BMI 30.0-34.9): ICD-10-CM

## 2018-06-12 DIAGNOSIS — E78.5 DYSLIPIDEMIA, GOAL LDL BELOW 130: Primary | ICD-10-CM

## 2018-06-12 DIAGNOSIS — L72.0 INCLUSION CYST: ICD-10-CM

## 2018-06-12 DIAGNOSIS — R20.2 INTERMITTENT TINGLING SENSATION OF LEFT HAND AND FOOT: ICD-10-CM

## 2018-06-12 DIAGNOSIS — E55.9 VITAMIN D DEFICIENCY: ICD-10-CM

## 2018-06-12 DIAGNOSIS — J30.1 SEASONAL ALLERGIC RHINITIS DUE TO POLLEN: ICD-10-CM

## 2018-06-12 NOTE — PROGRESS NOTES
Chief Complaint   Patient presents with    Cholesterol Problem     fasting   1. Have you been to the ER, urgent care clinic since your last visit? Hospitalized since your last visit? No    2. Have you seen or consulted any other health care providers outside of the [address] [place] [address] since your last visit? Include any pap smears or colon screening.  No

## 2018-06-12 NOTE — PROGRESS NOTES
HISTORY OF PRESENT ILLNESS  HPI  Priscila Chung is a 46 y.o. Male with a history of vitamin D deficiency and hyperlipidemia with LDL goal <130, who presents at the office today for a fasting follow up. Pt has not been doing a \"heart check\". Pt denies any recent ER visits or hospitalizations. Pt states that he has occasional tingling in his left foot at any time during the day when sitting or laying down. Pt states that it can last up to 10 minutes. Pt notes that the sensation does not wake him from sleep. Pt states that he has not had any back surgery, but admits that he did injure his back while climbing a fence. He notes that this injury did not radiate to his legs. Pt denies any similar sensations in other places on his body. Pt states that he has a lump just inside the buttock crease. Past Medical History:   Diagnosis Date    Dyslipidemia, goal LDL below 130      Past Surgical History:   Procedure Laterality Date    HX OTHER SURGICAL  1998    anal fissure     Current Outpatient Prescriptions on File Prior to Visit   Medication Sig Dispense Refill    simvastatin (ZOCOR) 20 mg tablet TAKE 1 TABLET BY MOUTH NIGHTLY 90 Tab 0    multivitamin, stress formula (STRESS TAB) tablet Take 1 Tab by mouth daily.  omega-3 fatty acids-vitamin e (FISH OIL) 1,000 mg Cap Take 4 Caps by mouth.  cholecalciferol, vitamin D3, (VITAMIN D3) 2,000 unit Tab Take 8,000 Units by mouth. No current facility-administered medications on file prior to visit.       No Known Allergies  Family History   Problem Relation Age of Onset   [facility] Cancer Mother 61    Other Father 80     Social History     Social History    Marital status:      Spouse name: N/A    Number of children: N/A    Years of education: N/A     Social History Main Topics    Smoking status: Never Smoker    Smokeless tobacco: Never Used    Alcohol use Yes    Drug use: No    Sexual activity: Not Asked     Other Topics Concern     Service Yes    Blood Transfusions No    Caffeine Concern No    Occupational Exposure No    Hobby Hazards No    Sleep Concern No    Stress Concern No    Weight Concern No    Special Diet No    Back Care No    Exercise Yes    Bike Helmet Not Asked     na    Seat Belt Yes    Self-Exams No     Social History Narrative             Review of Systems   Constitutional: Negative for chills, diaphoresis, fever, malaise/fatigue and weight loss. Eyes: Negative for blurred vision, double vision, pain and redness. Respiratory: Negative for cough, shortness of breath and wheezing. Cardiovascular: Negative for chest pain, palpitations, orthopnea, claudication, leg swelling and PND. Skin: Negative for itching and rash. Lump just inside buttock crease   Neurological: Positive for tingling (left foot). Negative for dizziness, tremors, sensory change, speech change, focal weakness, seizures, loss of consciousness, weakness and headaches. Results for orders placed or performed in visit on 06/12/18   LIPID PANEL   Result Value Ref Range    Cholesterol, total 206 (H) 100 - 199 mg/dL    Triglyceride 220 (H) 0 - 149 mg/dL    HDL Cholesterol 51 >39 mg/dL    VLDL, calculated 44 (H) 5 - 40 mg/dL    LDL, calculated 111 (H) 0 - 99 mg/dL   METABOLIC PANEL, COMPREHENSIVE   Result Value Ref Range    Glucose 98 65 - 99 mg/dL    BUN 20 6 - 24 mg/dL    Creatinine 0.95 0.76 - 1.27 mg/dL    GFR est non-AA 92 >59 mL/min/1.73    GFR est  >59 mL/min/1.73    BUN/Creatinine ratio 21 (H) 9 - 20    Sodium 138 134 - 144 mmol/L    Potassium 4.5 3.5 - 5.2 mmol/L    Chloride 103 96 - 106 mmol/L    CO2 20 20 - 29 mmol/L    Calcium 8.9 8.7 - 10.2 mg/dL    Protein, total 7.0 6.0 - 8.5 g/dL    Albumin 4.5 3.5 - 5.5 g/dL    GLOBULIN, TOTAL 2.5 1.5 - 4.5 g/dL    A-G Ratio 1.8 1.2 - 2.2    Bilirubin, total 0.4 0.0 - 1.2 mg/dL    Alk.  phosphatase 64 39 - 117 IU/L    AST (SGOT) 23 0 - 40 IU/L    ALT (SGPT) 37 0 - 44 IU/L   CVD REPORT Result Value Ref Range    INTERPRETATION Note          Physical Exam  Visit Vitals    /78    Pulse 89    Temp 98.2 °F (36.8 °C)    Resp 18    Ht 5' 8.5\" (1.74 m)    Wt 233 lb (105.7 kg)    SpO2 95%    BMI 34.91 kg/m2     Head: Normocephalic, without obvious abnormality, atraumatic  Eyes: conjunctivae/corneas clear. PERRL, EOM's intact. Neck: supple, symmetrical, trachea midline, no adenopathy, thyroid: not enlarged, symmetric, no tenderness/mass/nodules, no carotid bruit and no JVD  Lungs: clear to auscultation bilaterally  Heart: regular rate and rhythm, S1, S2 normal, no murmur, click, rub or gallop  Extremities: extremities normal, atraumatic, no cyanosis or edema  Skin: Skin color, texture, turgor normal. No rashes or lesions. 0.75 cm in diameter inclusion cyst on the left side of his buttock crease. Normal overlying skin. Pulses: 2+ and symmetric  Lymph nodes: Cervical, supraclavicular, and axillary nodes normal.  Neurologic: Grossly normal      ASSESSMENT and PLAN    ICD-10-CM ICD-9-CM    1. Dyslipidemia, goal LDL below 130 E78.5 272.4 LIPID PANEL      METABOLIC PANEL, COMPREHENSIVE   2. Vitamin D deficiency E55.9 268.9    3. Seasonal allergic rhinitis due to pollen J30.1 477.0      Diagnoses and all orders for this visit:    1. Dyslipidemia, goal LDL below 130  -     LIPID PANEL  -     METABOLIC PANEL, COMPREHENSIVE    2. Vitamin D deficiency    3. Seasonal allergic rhinitis due to pollen    Other orders  -     CVD REPORT      Follow-up Disposition:  Return in about 6 months (around 12/12/2018) for F/U cholesterol, F/U of obesity. lab results and schedule of future lab studies reviewed with patient  reviewed diet, exercise and weight control  cardiovascular risk and specific lipid/LDL goals reviewed  reviewed medications and side effects in detail  Please call my office if there are any questions- [phone].   I will arrange for follow up after the lab tests done from today return  Recommended a weekly \"heart check. \" I went into detail how to do this. Call for refills on medications as needed. Discussed expected course/resolution/complications of diagnosis in detail with patient. Medication risks/benefits/costs/interactions/alternatives discussed with patient. Pt was given an after visit summary which includes diagnoses, current medications & vitals. Pt expressed understanding with the diagnosis and plan. Total 25 minutes,60 % counseling re: Reviewed in detail the proper technique of checking the blood pressure- check it on an average day only, not on a stressful day, sitting, no exercise for at least 1 hour and not experiencing any new pain( chronic pain is OK). Patient encouraged to check BP sitting and standing at least once a month and to report these readings to me if > 140/ 90 on average , or if the standing BP is >  15 points lower than the sitting. Reviewed symptoms, or lack thereof, of hypertension and elevated cholesterol. BMI is significantly elevated- in the obese range. I reviewed diet, exercise and weight control. Discussed weight control in detail, the importance of mainly decreased carbs, and for weight maintenance, exercise; discussed different diets and that it isn't as important to watch the type of foods as it is to decrease calorie intake no matter what type of diet you do, etc.      Once again suggested that he do a \"heart check\", which he has been resistant to. Explained the significance of it. His tingling is likely a local nerve malfunction, and he will let us know if that becomes more frequent or constant, or if it starts to appear in other place of the body. Should monitor the inclusion cyst once a month and let us know if it changes in size. Also, discussed symptoms of concern that were noted today in the note above, treatment options( including doing nothing), when to follow up before recommended time frame.  Also, answered all questions. This document was written by Handy Duckworth, as dictated by Paola Grimes MD.  I have reviewed and agree with the above note and have made corrections where appropriate Len Thayer M.D.

## 2018-06-12 NOTE — LETTER
6/27/2018 12:31 PM 
 
Mr. Mayi GONSALEZ [phone] [id number] Dear Mayi Luna: 
 
Please find your most recent results below. Resulted Orders LIPID PANEL Result Value Ref Range Cholesterol, total 206 (H) 100 - 199 mg/dL Triglyceride 220 (H) 0 - 149 mg/dL HDL Cholesterol 51 >39 mg/dL VLDL, calculated 44 (H) 5 - 40 mg/dL LDL, calculated 111 (H) 0 - 99 mg/dL Narrative Performed at:  [address]  [id number] : Axel Lea MD, Phone:  [phone] METABOLIC PANEL, COMPREHENSIVE Result Value Ref Range Glucose 98 65 - 99 mg/dL BUN 20 6 - 24 mg/dL Creatinine 0.95 0.76 - 1.27 mg/dL GFR est non-AA 92 >59 mL/min/1.73 GFR est  >59 mL/min/1.73  
 BUN/Creatinine ratio 21 (H) 9 - 20 Sodium 138 134 - 144 mmol/L Potassium 4.5 3.5 - 5.2 mmol/L Chloride 103 96 - 106 mmol/L  
 CO2 20 20 - 29 mmol/L Comment: **Please note reference interval change** Calcium 8.9 8.7 - 10.2 mg/dL Protein, total 7.0 6.0 - 8.5 g/dL Albumin 4.5 3.5 - 5.5 g/dL GLOBULIN, TOTAL 2.5 1.5 - 4.5 g/dL A-G Ratio 1.8 1.2 - 2.2 Bilirubin, total 0.4 0.0 - 1.2 mg/dL Alk. phosphatase 64 39 - 117 IU/L  
 AST (SGOT) 23 0 - 40 IU/L  
 ALT (SGPT) 37 0 - 44 IU/L Narrative Performed at:  [address]  [id number] : Axel Lea MD, Phone:  [phone] CVD REPORT Result Value Ref Range INTERPRETATION Note Comment:  
   Supplemental report is available. Narrative Performed at:  [address] [place] [address]  [id number] : Jolly Iniguez MD, Phone:  [phone] RECOMMENDATIONS: 
GREAT NEWS!!  All of your recent lab tests are normal or at goal.  No diabetes, normal liver and kidney tests.  I would continue everything the same and schedule your next fasting office visit in 6 months. In addition, a physical is recommended every 2 years until the age of 61, but a prostate check should be done on a yearly basis after 40. Please call me if you have any questions: [phone] Sincerely, 
 
 
Antonietta Worthy MD

## 2018-06-12 NOTE — MR AVS SNAPSHOT
[address] [facility] [address] 
 
 
 [address] 
[phone] Patient: Priscila Chung MRN: [id number] :1965 Visit Information Date & Time Provider Department Dept. Phone Encounter #  
 2018 11:00 AM Jailyn Morales  [facility] [phone] [id number] Your Appointments 2018  2:30 PM  
Complete Physical with Jailyn Morales MD  
[facility]) Appt Note: physical  
 [address] [address]  
[phone]  
  
   
 [address] [address] Upcoming Health Maintenance Date Due Influenza Age 5 to Adult 2018 DTaP/Tdap/Td series (2 - Td) 10/27/2020 COLONOSCOPY 2020 Allergies as of 2018  Review Complete On: 2018 By: Jailyn Morales MD  
 No Known Allergies Current Immunizations  Never Reviewed Name Date Influenza Vaccine 2015 Influenza Vaccine (Quad) PF 2017 TDAP Vaccine 10/27/2010 Not reviewed this visit You Were Diagnosed With   
  
 Codes Comments Dyslipidemia, goal LDL below 130    -  Primary ICD-10-CM: E78.5 ICD-9-CM: 272.4 Vitamin D deficiency     ICD-10-CM: E55.9 ICD-9-CM: 268.9 Seasonal allergic rhinitis due to pollen     ICD-10-CM: J30.1 ICD-9-CM: 477.0 Vitals BP Pulse Temp Resp Height(growth percentile) Weight(growth percentile) 132/78 89 98.2 °F (36.8 °C) 18 5' 8.5\" (1.74 m) 233 lb (105.7 kg) SpO2 BMI Smoking Status 95% 34.91 kg/m2 Never Smoker BMI and BSA Data Body Mass Index Body Surface Area 34.91 kg/m 2 2.26 m 2 Preferred Pharmacy Pharmacy Name Phone [facility], Connie Sutherland [phone] Your Updated Medication List  
  
   
This list is accurate as of 18 12:00 PM.  Always use your most recent med list.  
  
  
  
  
 FISH OIL 1,000 mg Cap Generic drug:  omega-3 fatty acids-vitamin e Take 4 Caps by mouth.  
  
 multivitamin, stress formula tablet Commonly known as:  STRESS TAB Take 1 Tab by mouth daily. simvastatin 20 mg tablet Commonly known as:  ZOCOR  
TAKE 1 TABLET BY MOUTH NIGHTLY  
  
 VITAMIN D3 2,000 unit Tab Generic drug:  cholecalciferol (vitamin D3) Take 8,000 Units by mouth. We Performed the Following LIPID PANEL [36914 CPT(R)] METABOLIC PANEL, COMPREHENSIVE [29439 CPT(R)] Patient Instructions Learning About High Cholesterol What is high cholesterol? Cholesterol is a type of fat in your blood. It is needed for many body functions, such as making new cells. Cholesterol is made by your body. It also comes from food you eat. If you have too much cholesterol, it starts to build up in your arteries. This is called hardening of the arteries, or atherosclerosis. High cholesterol raises your risk of a heart attack and stroke. There are different types of cholesterol. LDL is the \"bad\" cholesterol. High LDL can raise your risk for heart disease, heart attack, and stroke. HDL is the \"good\" cholesterol. High HDL is linked with a lower risk for heart disease, heart attack, and stroke. Your cholesterol levels help your doctor find out your risk for having a heart attack or stroke. How can you prevent high cholesterol? A heart-healthy lifestyle can help you prevent high cholesterol. This lifestyle helps lower your risk for a heart attack and stroke. · Eat heart-healthy foods. ¨ Eat fruits, vegetables, whole grains (like oatmeal), dried beans and peas, nuts and seeds, soy products (like tofu), and fat-free or low-fat dairy products. ¨ Replace butter, margarine, and hydrogenated or partially hydrogenated oils with olive and canola oils. (Canola oil margarine without trans fat is fine.) ¨ Replace red meat with fish, poultry, and soy protein (like tofu). ¨ Limit processed and packaged foods like chips, crackers, and cookies. · Be active. Exercise can improve your cholesterol level. Get at least 30 minutes of exercise on most days of the week. Walking is a good choice. You also may want to do other activities, such as running, swimming, cycling, or playing tennis or team sports. · Stay at a healthy weight. Lose weight if you need to. · Don't smoke. If you need help quitting, talk to your doctor about stop-smoking programs and medicines. These can increase your chances of quitting for good. How is high cholesterol treated? The goal of treatment is to reduce your chances of having a heart attack or stroke. The goal is not to lower your cholesterol numbers only. · You may make lifestyle changes, such as eating healthy foods, not smoking, losing weight, and being more active. · You may have to take medicine. Follow-up care is a key part of your treatment and safety. Be sure to make and go to all appointments, and call your doctor if you are having problems. It's also a good idea to know your test results and keep a list of the medicines you take. Where can you learn more? Go to http://carson-pb.info/. Enter S678 in the search box to learn more about \"Learning About High Cholesterol. \" Current as of: September 21, 2016 Content Version: 11.4 © 6626-3770 Healthwise, Incorporated. Care instructions adapted under license by [employer] (which disclaims liability or warranty for this information). If you have questions about a medical condition or this instruction, always ask your healthcare professional. [place] [address] any warranty or liability for your use of this information. Introducing [facility]! Dear Pedro Luis Butcher: Thank you for requesting a [employer] account. Our records indicate that you already have an active [employer] account.   You can access your account anytime at [email or web address] Did you know that you can access your hospital and ER discharge instructions at any time in Pops? You can also review all of your test results from your hospital stay or ER visit. Additional Information If you have questions, please visit the Frequently Asked Questions section of the Pops website at [email or web address] Remember, Pops is NOT to be used for urgent needs. For medical emergencies, dial 911. Now available from your iPhone and Android! Please provide this summary of care documentation to your next provider. Your primary care clinician is listed as [place] [id number], [facility]/s . If you have any questions after today's visit, please call [phone].

## 2018-06-12 NOTE — PATIENT INSTRUCTIONS
Learning About High Cholesterol  What is high cholesterol? Cholesterol is a type of fat in your blood. It is needed for many body functions, such as making new cells. Cholesterol is made by your body. It also comes from food you eat. If you have too much cholesterol, it starts to build up in your arteries. This is called hardening of the arteries, or atherosclerosis. High cholesterol raises your risk of a heart attack and stroke. There are different types of cholesterol. LDL is the \"bad\" cholesterol. High LDL can raise your risk for heart disease, heart attack, and stroke. HDL is the \"good\" cholesterol. High HDL is linked with a lower risk for heart disease, heart attack, and stroke. Your cholesterol levels help your doctor find out your risk for having a heart attack or stroke. How can you prevent high cholesterol? A heart-healthy lifestyle can help you prevent high cholesterol. This lifestyle helps lower your risk for a heart attack and stroke. · Eat heart-healthy foods. ¨ Eat fruits, vegetables, whole grains (like oatmeal), dried beans and peas, nuts and seeds, soy products (like tofu), and fat-free or low-fat dairy products. ¨ Replace butter, margarine, and hydrogenated or partially hydrogenated oils with olive and canola oils. (Canola oil margarine without trans fat is fine.)  ¨ Replace red meat with fish, poultry, and soy protein (like tofu). ¨ Limit processed and packaged foods like chips, crackers, and cookies. · Be active. Exercise can improve your cholesterol level. Get at least 30 minutes of exercise on most days of the week. Walking is a good choice. You also may want to do other activities, such as running, swimming, cycling, or playing tennis or team sports. · Stay at a healthy weight. Lose weight if you need to. · Don't smoke. If you need help quitting, talk to your doctor about stop-smoking programs and medicines. These can increase your chances of quitting for good.   How is high cholesterol treated? The goal of treatment is to reduce your chances of having a heart attack or stroke. The goal is not to lower your cholesterol numbers only. · You may make lifestyle changes, such as eating healthy foods, not smoking, losing weight, and being more active. · You may have to take medicine. Follow-up care is a key part of your treatment and safety. Be sure to make and go to all appointments, and call your doctor if you are having problems. It's also a good idea to know your test results and keep a list of the medicines you take. Where can you learn more? Go to http://carson-pb.info/. Enter E030 in the search box to learn more about \"Learning About High Cholesterol. \"  Current as of: September 21, 2016  Content Version: 11.4  © 8191-8836 Healthwise, Incorporated. Care instructions adapted under license by [employer] (which disclaims liability or warranty for this information). If you have questions about a medical condition or this instruction, always ask your healthcare professional. [place] [address] any warranty or liability for your use of this information.

## 2018-06-13 LAB
ALBUMIN SERPL-MCNC: 4.5 G/DL (ref 3.5–5.5)
ALBUMIN/GLOB SERPL: 1.8 {RATIO} (ref 1.2–2.2)
ALP SERPL-CCNC: 64 IU/L (ref 39–117)
ALT SERPL-CCNC: 37 IU/L (ref 0–44)
AST SERPL-CCNC: 23 IU/L (ref 0–40)
BILIRUB SERPL-MCNC: 0.4 MG/DL (ref 0–1.2)
BUN SERPL-MCNC: 20 MG/DL (ref 6–24)
BUN/CREAT SERPL: 21 (ref 9–20)
CALCIUM SERPL-MCNC: 8.9 MG/DL (ref 8.7–10.2)
CHLORIDE SERPL-SCNC: 103 MMOL/L (ref 96–106)
CHOLEST SERPL-MCNC: 206 MG/DL (ref 100–199)
CO2 SERPL-SCNC: 20 MMOL/L (ref 20–29)
CREAT SERPL-MCNC: 0.95 MG/DL (ref 0.76–1.27)
GFR SERPLBLD CREATININE-BSD FMLA CKD-EPI: 106 ML/MIN/1.73
GFR SERPLBLD CREATININE-BSD FMLA CKD-EPI: 92 ML/MIN/1.73
GLOBULIN SER CALC-MCNC: 2.5 G/DL (ref 1.5–4.5)
GLUCOSE SERPL-MCNC: 98 MG/DL (ref 65–99)
HDLC SERPL-MCNC: 51 MG/DL
INTERPRETATION, 910389: NORMAL
LDLC SERPL CALC-MCNC: 111 MG/DL (ref 0–99)
POTASSIUM SERPL-SCNC: 4.5 MMOL/L (ref 3.5–5.2)
PROT SERPL-MCNC: 7 G/DL (ref 6–8.5)
SODIUM SERPL-SCNC: 138 MMOL/L (ref 134–144)
TRIGL SERPL-MCNC: 220 MG/DL (ref 0–149)
VLDLC SERPL CALC-MCNC: 44 MG/DL (ref 5–40)

## 2018-06-17 PROBLEM — E66.9 OBESITY (BMI 30.0-34.9): Chronic | Status: ACTIVE | Noted: 2018-06-17

## 2018-06-27 NOTE — PROGRESS NOTES
GREAT NEWS!! All of your recent lab tests are normal or at goal.  No diabetes, normal liver and kidney tests. I would continue everything the same and schedule your next fasting office visit in 6 months. In addition, a physical is recommended every 2 years until the age of 61, but a prostate check should be done on a yearly basis after 40.

## 2018-08-14 DIAGNOSIS — E78.5 HYPERLIPIDEMIA WITH TARGET LDL LESS THAN 130: ICD-10-CM

## 2018-08-15 RX ORDER — SIMVASTATIN 20 MG/1
TABLET, FILM COATED ORAL
Qty: 90 TAB | Refills: 0 | Status: SHIPPED | OUTPATIENT
Start: 2018-08-15 | End: 2018-11-16 | Stop reason: SDUPTHER

## 2018-11-16 DIAGNOSIS — E78.5 HYPERLIPIDEMIA WITH TARGET LDL LESS THAN 130: ICD-10-CM

## 2018-11-16 RX ORDER — SIMVASTATIN 20 MG/1
TABLET, FILM COATED ORAL
Qty: 90 TAB | Refills: 0 | Status: SHIPPED | OUTPATIENT
Start: 2018-11-16 | End: 2019-02-02 | Stop reason: SDUPTHER

## 2018-11-16 NOTE — TELEPHONE ENCOUNTER
[facility] calling [facility] behalf of the pt requesting a refill on the medication.       .  Requested Prescriptions     Pending Prescriptions Disp Refills    simvastatin (ZOCOR) 20 mg tablet 90 Tab 0     Pharmacy verified    LOV:  Tuesday, June 12, 2018  UOV:  Monday, December 17, 2018

## 2018-12-17 ENCOUNTER — OFFICE VISIT (OUTPATIENT)
Dept: FAMILY MEDICINE CLINIC | Age: 53
End: 2018-12-17

## 2018-12-17 VITALS
RESPIRATION RATE: 15 BRPM | DIASTOLIC BLOOD PRESSURE: 80 MMHG | HEIGHT: 69 IN | BODY MASS INDEX: 35.49 KG/M2 | OXYGEN SATURATION: 97 % | TEMPERATURE: 98.6 F | WEIGHT: 239.6 LBS | SYSTOLIC BLOOD PRESSURE: 122 MMHG | HEART RATE: 97 BPM

## 2018-12-17 DIAGNOSIS — E78.5 DYSLIPIDEMIA, GOAL LDL BELOW 130: ICD-10-CM

## 2018-12-17 DIAGNOSIS — N40.1 BPH ASSOCIATED WITH NOCTURIA: Chronic | ICD-10-CM

## 2018-12-17 DIAGNOSIS — R35.1 BPH ASSOCIATED WITH NOCTURIA: Chronic | ICD-10-CM

## 2018-12-17 DIAGNOSIS — Z00.00 ROUTINE GENERAL MEDICAL EXAMINATION AT A HEALTH CARE FACILITY: Primary | ICD-10-CM

## 2018-12-17 DIAGNOSIS — E55.9 VITAMIN D DEFICIENCY: ICD-10-CM

## 2018-12-17 DIAGNOSIS — Z23 ENCOUNTER FOR IMMUNIZATION: ICD-10-CM

## 2018-12-17 DIAGNOSIS — E66.01 SEVERE OBESITY (HCC): ICD-10-CM

## 2018-12-17 DIAGNOSIS — Z00.00 ENCOUNTER FOR ANNUAL PHYSICAL EXAM: ICD-10-CM

## 2018-12-17 NOTE — PROGRESS NOTES
1. Have you been to the ER, urgent care clinic since your last visit? Hospitalized since your last visit? No    2. Have you seen or consulted any other health care providers outside of the [address] [place] [address] since your last visit? Include any pap smears or colon screening. No     Chief Complaint   Patient presents with    Complete Physical    Cholesterol Problem     follow up    Vitamin D Deficiency     follow up     fasting    Shingrix: has not had it done. Flu vaccine: would like the flu shot today. Patient presents for routine immunizations. Patient denies any symptoms , reactions or allergies that would exclude them from being immunized today. After obtaining written consent, and per verbal orders of Dr. Miesha Joseph, injection of influenza ordered, signed, and given on left deltoid. Risks and adverse reactions were discussed and the VIS was given to them. All questions were addressed. Patient was observed for 15 minutes post injection. There were no reactions observed at this time.  Advised patient to call with any concerns or signs and symptoms of adverse reaction.      Bonnie Mulligan LPN

## 2018-12-17 NOTE — PROGRESS NOTES
HISTORY OF PRESENT ILLNESS  HPI  Annel Turner is a 48 y.o. Male with a history of vitamin D deficiency and hyperlipidemia with LDL goal <130, who presents at the office today for a complete physical. Pt takes 8000 units of vitamin D daily. Pt denies unusual SOB, chest pain, and any recent ER visits or hospitalizations. Pt received the flu shot today. Pt's mother  of metastatic abdominal cancer 2 weeks after Dx- No primary ever discussed/ found. . Pt's uncle  of pancreatic cancer. Past Medical History:   Diagnosis Date    Dyslipidemia, goal LDL below 130     Obesity (BMI 30.0-34.9) 2018     Past Surgical History:   Procedure Laterality Date    HX OTHER SURGICAL      anal fissure     Current Outpatient Medications on File Prior to Visit   Medication Sig Dispense Refill    simvastatin (ZOCOR) 20 mg tablet TAKE 1 TABLET BY MOUTH EVERY NIGHT 90 Tab 0    multivitamin, stress formula (STRESS TAB) tablet Take 1 Tab by mouth daily.  omega-3 fatty acids-vitamin e (FISH OIL) 1,000 mg Cap Take 4 Caps by mouth.  cholecalciferol, vitamin D3, (VITAMIN D3) 2,000 unit Tab Take 8,000 Units by mouth. No current facility-administered medications on file prior to visit. No Known Allergies  Family History   Problem Relation Age of Onset    Cancer Mother 61        metastatic cancer to abd cavity, unclear primary    Other Father 80    Pancreatic Cancer Maternal Uncle      Social History     Socioeconomic History    Marital status:      Spouse name: Not on file    Number of children: Not on file    Years of education: Not on file    Highest education level: Not on file   Tobacco Use    Smoking status: Never Smoker    Smokeless tobacco: Never Used   Substance and Sexual Activity    Alcohol use:  Yes    Drug use: No   Other Topics Concern     Service Yes    Blood Transfusions No    Caffeine Concern No    Occupational Exposure No    Hobby Hazards No    Sleep Concern No    Stress Concern No    Weight Concern No    Special Diet No    Back Care No    Exercise Yes    Seat Belt Yes    Self-Exams No           Review of Systems   Constitutional: Negative for chills, diaphoresis, fever, malaise/fatigue and weight loss. HENT: Negative for congestion, ear discharge, ear pain, hearing loss, nosebleeds, sinus pain, sore throat and tinnitus. Eyes: Negative for blurred vision, double vision, photophobia, pain, discharge and redness. Respiratory: Negative for cough, hemoptysis, sputum production, shortness of breath, wheezing and stridor. Cardiovascular: Negative for chest pain, palpitations, orthopnea, claudication, leg swelling and PND. Gastrointestinal: Negative for abdominal pain, blood in stool, constipation, diarrhea, heartburn, melena, nausea and vomiting. Genitourinary: Negative for dysuria, flank pain, frequency, hematuria and urgency. Musculoskeletal: Negative for back pain, falls, joint pain, myalgias and neck pain. Skin: Negative for itching and rash. Neurological: Negative for dizziness, tingling, tremors, sensory change, speech change, focal weakness, seizures, loss of consciousness, weakness and headaches. Endo/Heme/Allergies: Negative for environmental allergies and polydipsia. Does not bruise/bleed easily. Psychiatric/Behavioral: Negative for depression, hallucinations, memory loss, substance abuse and suicidal ideas. The patient is not nervous/anxious and does not have insomnia.       Results for orders placed or performed in visit on 06/12/18   LIPID PANEL   Result Value Ref Range    Cholesterol, total 206 (H) 100 - 199 mg/dL    Triglyceride 220 (H) 0 - 149 mg/dL    HDL Cholesterol 51 >39 mg/dL    VLDL, calculated 44 (H) 5 - 40 mg/dL    LDL, calculated 111 (H) 0 - 99 mg/dL   METABOLIC PANEL, COMPREHENSIVE   Result Value Ref Range    Glucose 98 65 - 99 mg/dL    BUN 20 6 - 24 mg/dL    Creatinine 0.95 0.76 - 1.27 mg/dL    GFR est non-AA 92 >59 mL/min/1.73    GFR est  >59 mL/min/1.73    BUN/Creatinine ratio 21 (H) 9 - 20    Sodium 138 134 - 144 mmol/L    Potassium 4.5 3.5 - 5.2 mmol/L    Chloride 103 96 - 106 mmol/L    CO2 20 20 - 29 mmol/L    Calcium 8.9 8.7 - 10.2 mg/dL    Protein, total 7.0 6.0 - 8.5 g/dL    Albumin 4.5 3.5 - 5.5 g/dL    GLOBULIN, TOTAL 2.5 1.5 - 4.5 g/dL    A-G Ratio 1.8 1.2 - 2.2    Bilirubin, total 0.4 0.0 - 1.2 mg/dL    Alk. phosphatase 64 39 - 117 IU/L    AST (SGOT) 23 0 - 40 IU/L    ALT (SGPT) 37 0 - 44 IU/L   CVD REPORT   Result Value Ref Range    INTERPRETATION Note          Physical Exam  Visit Vitals  /80 (BP 1 Location: Left arm, BP Patient Position: Sitting)   Pulse 97   Temp 98.6 °F (37 °C) (Oral)   Resp 15   Ht 5' 8.5\" (1.74 m)   Wt 239 lb 9.6 oz (108.7 kg)   SpO2 97%   BMI 35.90 kg/m²     General:  Alert, cooperative, no distress, appears stated age. Head:  Normocephalic, without obvious abnormality, atraumatic. Eyes:  Conjunctivae/corneas clear. PERRL, EOMs intact. Fundi benign   Ears:  Normal TMs and external ear canals both ears. Nose: Nares normal. Septum midline. Mucosa normal. No drainage or sinus tenderness. Throat: Lips, mucosa, and tongue normal. Teeth and gums normal.   Neck: Supple, symmetrical, trachea midline, no adenopathy, thyroid: no enlargement/tenderness/nodules, no carotid bruit and no JVD. Back:   Symmetric, no curvature. ROM normal. No CVA tenderness. Lungs:   Clear to auscultation bilaterally. Chest wall:  No tenderness or deformity. Heart:  Regular rate and rhythm, S1, S2 normal, no murmur, click, rub or gallop. Abdomen:   Soft, non-tender. Bowel sounds normal. No masses,  No organomegaly. Genitalia:  Normal male without lesion, discharge or tenderness. Rectal:  Normal tone, 1-2 symmetrically enlarged prostate, no masses or tenderness  Guaiac negative stool. Extremities: Extremities normal, atraumatic, no cyanosis or edema.    Pulses: 2+ and symmetric all extremities. Skin: Skin color, texture, turgor normal. No rashes or lesions   Lymph nodes: Cervical, supraclavicular, and axillary nodes normal.   Neurologic: CNII-XII intact. Normal strength, sensation and reflexes throughout. ASSESSMENT and PLAN    ICD-10-CM ICD-9-CM    1. Routine general medical examination at a health care facility Z00.00 V70.0    2. Dyslipidemia, goal LDL below 130 E78.5 272.4 LIPID PANEL      KS HANDLG&/OR CONVEY OF SPEC FOR TR OFFICE TO LAB      COLLECTION VENOUS BLOOD,VENIPUNCTURE   3. Vitamin D deficiency E55.9 268.9 VITAMIN D, 25 HYDROXY      KS HANDLG&/OR CONVEY OF SPEC FOR TR OFFICE TO LAB      COLLECTION VENOUS BLOOD,VENIPUNCTURE   4. Encounter for annual physical exam Z00.00 V70.0 LIPID PANEL      METABOLIC PANEL, COMPREHENSIVE      CBC W/O DIFF      URINALYSIS W/ RFLX MICROSCOPIC      PSA, DIAGNOSTIC (PROSTATE SPECIFIC AG)      KS HANDLG&/OR CONVEY OF SPEC FOR TR OFFICE TO LAB      COLLECTION VENOUS BLOOD,VENIPUNCTURE   5. Encounter for immunization Z23 V03.89 INFLUENZA VIRUS VAC QUAD,SPLIT,PRESV FREE SYRINGE IM      KS IMMUNIZ ADMIN,1 SINGLE/COMB VAC/TOXOID   6. Severe obesity ([facility] [address] E66.01 278.01    7. BPH associated with nocturia N40.1 600.01     R35.1 788. 43     nocturia x1 - 12/2018     Diagnoses and all orders for this visit:    1. Routine general medical examination at a health care facility    2. Dyslipidemia, goal LDL below 130  -     LIPID PANEL  -     KS HANDLG&/OR CONVEY OF SPEC FOR TR OFFICE TO LAB  -     COLLECTION VENOUS BLOOD,VENIPUNCTURE    3. Vitamin D deficiency  -     VITAMIN D, 25 HYDROXY  -     KS HANDLG&/OR CONVEY OF SPEC FOR TR OFFICE TO LAB  -     COLLECTION VENOUS BLOOD,VENIPUNCTURE    4.  Encounter for annual physical exam  -     LIPID PANEL  -     METABOLIC PANEL, COMPREHENSIVE  -     CBC W/O DIFF  -     URINALYSIS W/ RFLX MICROSCOPIC  -     PSA, DIAGNOSTIC (PROSTATE SPECIFIC AG)  -     KS HANDLG&/OR CONVEY OF SPEC FOR TR OFFICE TO LAB  - COLLECTION VENOUS BLOOD,VENIPUNCTURE    5. Encounter for immunization  -     INFLUENZA VIRUS VAC QUAD,SPLIT,PRESV FREE SYRINGE IM  -     DC IMMUNIZ ADMIN,1 SINGLE/COMB VAC/TOXOID    6. Severe obesity ([address]  Assessment & Plan:  Worsening, based on history, physical exam and review of pertinent labs, studies and medications; meds reconciled; continue current treatment plan, lifestyle modifications recommended, try a low carb diet. Key Obesity Meds     Patient is on no anti-obesity meds. Lab Results   Component Value Date/Time    Glucose 98 06/12/2018 11:23 AM    Cholesterol, total 206 06/12/2018 11:23 AM    HDL Cholesterol 51 06/12/2018 11:23 AM    LDL, calculated 111 06/12/2018 11:23 AM    Triglyceride 220 06/12/2018 11:23 AM    Sodium 138 06/12/2018 11:23 AM    Potassium 4.5 06/12/2018 11:23 AM    ALT (SGPT) 37 06/12/2018 11:23 AM    AST (SGOT) 23 06/12/2018 11:23 AM    VITAMIN D, 25-HYDROXY 65.8 11/20/2017 10:38 AM             7. BPH associated with nocturia  Comments:  nocturia x1 - 12/2018    Follow-up Disposition:  Return in about 6 months (around 6/17/2019), or unable to lose weight, for F/U cholesterol, F/U of obesity. lab results and schedule of future lab studies reviewed with patient  reviewed diet, exercise and weight control  cardiovascular risk and specific lipid/LDL goals reviewed  reviewed medications and side effects in detail  Please call my office if there are any questions- [phone]. I will arrange for follow up after the lab tests done from today return  Recommended a weekly \"heart check. \" I went into detail how to do this. Call for refills on medications as needed. Discussed expected course/resolution/complications of diagnosis in detail with patient. Medication risks/benefits/costs/interactions/alternatives discussed with patient. Pt was given an after visit summary which includes diagnoses, current medications & vitals.    Pt expressed understanding with the diagnosis and plan.    Total 45 minutes,60 % counseling re: Recommended a weekly \"heart check. \" I went into detail how to do this. BMI is significantly elevated- in the obese range. I reviewed diet, exercise and weight control. Discussed weight control in detail, the importance of mainly decreased carbs, and for weight maintenance, exercise; discussed different diets and that it isn't as important to watch the type of foods as it is to decrease calorie intake no matter what type of diet you do, etc.        Regular exercise is very important to your health; it helps mentally, physically, socially; it prevents injuries if done properly. Exercise, even as simple as walking 20-30 minutes daily has major benefits to your health even though your \"numbers\" are the same in the lab. See if you can add this into your daily regimen and after a few months it will become a regular habit-\"just something you do,\" like brushing your teeth. A combination of aerobic exercise and strengthening and stretching is felt to be the best for you, so this should be your ultimate goal.   This can be done in the privacy of your home or in a group setting as at the gym  Some prefer having a , others prefer to do exercise in groups or individually. Do what \"works\" for you. You need to make it simple and \"fun,\" or you most likely you will not continue it. Reviewed symptoms, or lack thereof, of hypertension and elevated cholesterol. Reviewed in detail the proper technique of checking the blood pressure- check it on an average day only, not on a stressful day, sitting, no exercise for at least 1 hour and not experiencing any new pain( chronic pain is OK). Patient encouraged to check BP sitting and standing at least once a month and to report these readings to me if > 130/ 80 on average , or if the standing BP is >  15 points lower than the sitting. He has an annual eye exam, with no issues besides his need for glasses.      Because he is prone to nicks and cuts, we will update his tetanus on his next visit, as he did not want to get two injections on the same day. He has nocturia, typically once per night, with no recent significant change and no decreased urine flow. I recommended that he start taking a men's vitamin for prostate. Another option would be to get a prescription, such as Flomax, but he would prefer to hold off on that, as he is currently not symptomatic. Also, discussed symptoms of concern that were noted today in the note above, treatment options( including doing nothing), when to follow up before recommended time frame. Also, answered all questions. This document was written by Nazario Wolf, as dictated by Caridad Munguia MD.  I have reviewed and agree with the above note and have made corrections where appropriate Len Valadez M.D.

## 2018-12-17 NOTE — ASSESSMENT & PLAN NOTE
Worsening, based on history, physical exam and review of pertinent labs, studies and medications; meds reconciled; continue current treatment plan, lifestyle modifications recommended, try a low carb diet. Key Obesity Meds     Patient is on no anti-obesity meds.         Lab Results   Component Value Date/Time    Glucose 98 06/12/2018 11:23 AM    Cholesterol, total 206 06/12/2018 11:23 AM    HDL Cholesterol 51 06/12/2018 11:23 AM    LDL, calculated 111 06/12/2018 11:23 AM    Triglyceride 220 06/12/2018 11:23 AM    Sodium 138 06/12/2018 11:23 AM    Potassium 4.5 06/12/2018 11:23 AM    ALT (SGPT) 37 06/12/2018 11:23 AM    AST (SGOT) 23 06/12/2018 11:23 AM    VITAMIN D, 25-HYDROXY 65.8 11/20/2017 10:38 AM

## 2018-12-17 NOTE — PATIENT INSTRUCTIONS
Vaccine Information Statement    Influenza (Flu) Vaccine (Inactivated or Recombinant): What you need to know    Many Vaccine Information Statements are available in Telugu and other languages. See www.immunize.org/vis  Hojas de Información Sobre Vacunas están disponibles en Español y en muchos otros idiomas. Visite www.immunize.org/vis    1. Why get vaccinated? Influenza (flu) is a contagious disease that spreads around the [place] every year, usually between October and May. Flu is caused by influenza viruses, and is spread mainly by coughing, sneezing, and close contact. Anyone can get flu. Flu strikes suddenly and can last several days. Symptoms vary by age, but can include:   fever/chills   sore throat   muscle aches   fatigue   cough   headache    runny or stuffy nose    Flu can also lead to pneumonia and blood infections, and cause diarrhea and seizures in children. If you have a medical condition, such as heart or lung disease, flu can make it worse. Flu is more dangerous for some people. Infants and young children, people 72years of age and older, pregnant women, and people with certain health conditions or a weakened immune system are at greatest risk. Each year thousands of people in the [facility] die from flu, and many more are hospitalized. Flu vaccine can:   keep you from getting flu,   make flu less severe if you do get it, and   keep you from spreading flu to your family and other people. 2. Inactivated and recombinant flu vaccines    A dose of flu vaccine is recommended every flu season. Children 6 months through 6years of age may need two doses during the same flu season. Everyone else needs only one dose each flu season.        Some inactivated flu vaccines contain a very small amount of a mercury-based preservative called thimerosal. Studies have not shown thimerosal in vaccines to be harmful, but flu vaccines that do not contain thimerosal are available. There is no live flu virus in flu shots. They cannot cause the flu. There are many flu viruses, and they are always changing. Each year a new flu vaccine is made to protect against three or four viruses that are likely to cause disease in the upcoming flu season. But even when the vaccine doesnt exactly match these viruses, it may still provide some protection    Flu vaccine cannot prevent:   flu that is caused by a virus not covered by the vaccine, or   illnesses that look like flu but are not. It takes about 2 weeks for protection to develop after vaccination, and protection lasts through the flu season. 3. Some people should not get this vaccine    Tell the person who is giving you the vaccine:     If you have any severe, life-threatening allergies. If you ever had a life-threatening allergic reaction after a dose of flu vaccine, or have a severe allergy to any part of this vaccine, you may be advised not to get vaccinated. Most, but not all, types of flu vaccine contain a small amount of egg protein.  If you ever had Guillain-Barré Syndrome (also called GBS). Some people with a history of GBS should not get this vaccine. This should be discussed with your doctor.  If you are not feeling well. It is usually okay to get flu vaccine when you have a mild illness, but you might be asked to come back when you feel better. 4. Risks of a vaccine reaction    With any medicine, including vaccines, there is a chance of reactions. These are usually mild and go away on their own, but serious reactions are also possible. Most people who get a flu shot do not have any problems with it.      Minor problems following a flu shot include:    soreness, redness, or swelling where the shot was given     hoarseness   sore, red or itchy eyes   cough   fever   aches   headache   itching   fatigue  If these problems occur, they usually begin soon after the shot and last 1 or 2 days. More serious problems following a flu shot can include the following:     There may be a small increased risk of Guillain-Barré Syndrome (GBS) after inactivated flu vaccine. This risk has been estimated at 1 or 2 additional cases per million people vaccinated. This is much lower than the risk of severe complications from flu, which can be prevented by flu vaccine.  Young children who get the flu shot along with pneumococcal vaccine (PCV13) and/or DTaP vaccine at the same time might be slightly more likely to have a seizure caused by fever. Ask your doctor for more information. Tell your doctor if a child who is getting flu vaccine has ever had a seizure. Problems that could happen after any injected vaccine:      People sometimes faint after a medical procedure, including vaccination. Sitting or lying down for about 15 minutes can help prevent fainting, and injuries caused by a fall. Tell your doctor if you feel dizzy, or have vision changes or ringing in the ears.  Some people get severe pain in the shoulder and have difficulty moving the arm where a shot was given. This happens very rarely.  Any medication can cause a severe allergic reaction. Such reactions from a vaccine are very rare, estimated at about 1 in a million doses, and would happen within a few minutes to a few hours after the vaccination. As with any medicine, there is a very remote chance of a vaccine causing a serious injury or death. The safety of vaccines is always being monitored. For more information, visit: www.cdc.gov/vaccinesafety/    5. What if there is a serious reaction? What should I look for?  Look for anything that concerns you, such as signs of a severe allergic reaction, very high fever, or unusual behavior.     Signs of a severe allergic reaction can include hives, swelling of the face and throat, difficulty breathing, a fast heartbeat, dizziness, and weakness  usually within a few minutes to a few hours after the vaccination. What should I do?  If you think it is a severe allergic reaction or other emergency that cant wait, call 9-1-1 and get the person to the nearest hospital. Otherwise, call your doctor.  Reactions should be reported to the Vaccine Adverse Event Reporting System (VAERS). Your doctor should file this report, or you can do it yourself through  the VAERS web site at www.vaers. [facility].gov, or by calling 7-569.392.5845. VAERS does not give medical advice. 6. The National Vaccine Injury Compensation Program    The [facility] Vaccine Injury Compensation Program (VICP) is a federal program that was created to compensate people who may have been injured by certain vaccines. Persons who believe they may have been injured by a vaccine can learn about the program and about filing a claim by calling 5-115.238.9405 or visiting the [employer] website at www.[facility].gov/vaccinecompensation. There is a time limit to file a claim for compensation. 7. How can I learn more?  Ask your healthcare provider. He or she can give you the vaccine package insert or suggest other sources of information.  Call your local or state health department.  Contact the Centers for Disease Control and Prevention (CDC):  - Call 4-209.809.9908 (1-800-CDC-INFO) or  - Visit CDCs website at www.cdc.gov/flu    Vaccine Information Statement   Inactivated Influenza Vaccine   8/7/2015  42 DOLLY Wiggins [id number]-25    Department of Health and Human Services  Centers for Disease Control and Prevention    Office Use Only

## 2018-12-18 LAB
25(OH)D3+25(OH)D2 SERPL-MCNC: 69.1 NG/ML (ref 30–100)
ALBUMIN SERPL-MCNC: 4.7 G/DL (ref 3.5–5.5)
ALBUMIN/GLOB SERPL: 1.6 {RATIO} (ref 1.2–2.2)
ALP SERPL-CCNC: 72 IU/L (ref 39–117)
ALT SERPL-CCNC: 34 IU/L (ref 0–44)
APPEARANCE UR: CLEAR
AST SERPL-CCNC: 20 IU/L (ref 0–40)
BILIRUB SERPL-MCNC: 0.4 MG/DL (ref 0–1.2)
BILIRUB UR QL STRIP: NEGATIVE
BUN SERPL-MCNC: 22 MG/DL (ref 6–24)
BUN/CREAT SERPL: 23 (ref 9–20)
CALCIUM SERPL-MCNC: 9.1 MG/DL (ref 8.7–10.2)
CHLORIDE SERPL-SCNC: 104 MMOL/L (ref 96–106)
CHOLEST SERPL-MCNC: 219 MG/DL (ref 100–199)
CO2 SERPL-SCNC: 21 MMOL/L (ref 20–29)
COLOR UR: YELLOW
CREAT SERPL-MCNC: 0.97 MG/DL (ref 0.76–1.27)
ERYTHROCYTE [DISTWIDTH] IN BLOOD BY AUTOMATED COUNT: 13.6 % (ref 12.3–15.4)
GLOBULIN SER CALC-MCNC: 3 G/DL (ref 1.5–4.5)
GLUCOSE SERPL-MCNC: 92 MG/DL (ref 65–99)
GLUCOSE UR QL: NEGATIVE
HCT VFR BLD AUTO: 46.8 % (ref 37.5–51)
HDLC SERPL-MCNC: 48 MG/DL
HGB BLD-MCNC: 15.9 G/DL (ref 13–17.7)
HGB UR QL STRIP: NEGATIVE
INTERPRETATION, 910389: NORMAL
KETONES UR QL STRIP: NEGATIVE
LDLC SERPL CALC-MCNC: 122 MG/DL (ref 0–99)
LEUKOCYTE ESTERASE UR QL STRIP: NEGATIVE
MCH RBC QN AUTO: 29.6 PG (ref 26.6–33)
MCHC RBC AUTO-ENTMCNC: 34 G/DL (ref 31.5–35.7)
MCV RBC AUTO: 87 FL (ref 79–97)
MICRO URNS: NORMAL
NITRITE UR QL STRIP: NEGATIVE
PH UR STRIP: 5.5 [PH] (ref 5–7.5)
PLATELET # BLD AUTO: 286 X10E3/UL (ref 150–379)
POTASSIUM SERPL-SCNC: 4.3 MMOL/L (ref 3.5–5.2)
PROT SERPL-MCNC: 7.7 G/DL (ref 6–8.5)
PROT UR QL STRIP: NEGATIVE
PSA SERPL-MCNC: 0.8 NG/ML (ref 0–4)
RBC # BLD AUTO: 5.37 X10E6/UL (ref 4.14–5.8)
SODIUM SERPL-SCNC: 136 MMOL/L (ref 134–144)
SP GR UR: 1.03 (ref 1–1.03)
TRIGL SERPL-MCNC: 244 MG/DL (ref 0–149)
UROBILINOGEN UR STRIP-MCNC: 0.2 MG/DL (ref 0.2–1)
VLDLC SERPL CALC-MCNC: 49 MG/DL (ref 5–40)
WBC # BLD AUTO: 6.9 X10E3/UL (ref 3.4–10.8)

## 2018-12-19 NOTE — PROGRESS NOTES
All of your recent lab tests are normal or at goal except as expected, the weight gain is increasing the triglycerides and LDL. No diabetes, normal liver and kidney tests. Since you are seriously planning on weight loss, I will hold off on additional medication or dose increase and I would continue everything the same and schedule your next fasting office visit in 3 or6 months whichever will keep you on your weight loss journey the best.. In addition, a physical/ Annual Wellness Visit is recommended every year after the age of 61.

## 2019-04-26 ENCOUNTER — TELEPHONE (OUTPATIENT)
Dept: FAMILY MEDICINE CLINIC | Age: 54
End: 2019-04-26

## 2019-04-26 NOTE — TELEPHONE ENCOUNTER
Pt.'s wife Cathy Sanchez) is calling requesting a call back in regards to schedule an appt. For a physical exam with dr. Meghan Prajpaati in the last week of October.      Best call # [phone]

## 2019-05-12 DIAGNOSIS — E78.5 HYPERLIPIDEMIA WITH TARGET LDL LESS THAN 130: ICD-10-CM

## 2019-05-14 RX ORDER — SIMVASTATIN 20 MG/1
TABLET, FILM COATED ORAL
Qty: 90 TAB | Refills: 0 | Status: SHIPPED | OUTPATIENT
Start: 2019-05-14 | End: 2019-06-24 | Stop reason: SDUPTHER

## 2019-06-24 ENCOUNTER — OFFICE VISIT (OUTPATIENT)
Dept: FAMILY MEDICINE CLINIC | Age: 54
End: 2019-06-24

## 2019-06-24 VITALS
OXYGEN SATURATION: 95 % | DIASTOLIC BLOOD PRESSURE: 78 MMHG | HEIGHT: 69 IN | BODY MASS INDEX: 33.77 KG/M2 | HEART RATE: 84 BPM | WEIGHT: 228 LBS | SYSTOLIC BLOOD PRESSURE: 110 MMHG | RESPIRATION RATE: 18 BRPM | TEMPERATURE: 97.9 F

## 2019-06-24 DIAGNOSIS — R35.1 BPH ASSOCIATED WITH NOCTURIA: ICD-10-CM

## 2019-06-24 DIAGNOSIS — E78.5 HYPERLIPIDEMIA WITH TARGET LDL LESS THAN 130: ICD-10-CM

## 2019-06-24 DIAGNOSIS — Z23 NEED FOR ZOSTER VACCINATION: ICD-10-CM

## 2019-06-24 DIAGNOSIS — J30.1 SEASONAL ALLERGIC RHINITIS DUE TO POLLEN: ICD-10-CM

## 2019-06-24 DIAGNOSIS — E66.9 OBESITY (BMI 30.0-34.9): ICD-10-CM

## 2019-06-24 DIAGNOSIS — N40.1 BPH ASSOCIATED WITH NOCTURIA: ICD-10-CM

## 2019-06-24 DIAGNOSIS — E78.5 DYSLIPIDEMIA, GOAL LDL BELOW 130: Primary | ICD-10-CM

## 2019-06-24 DIAGNOSIS — E55.9 VITAMIN D DEFICIENCY: ICD-10-CM

## 2019-06-24 RX ORDER — SIMVASTATIN 20 MG/1
20 TABLET, FILM COATED ORAL
Qty: 90 TAB | Refills: 0 | Status: SHIPPED | OUTPATIENT
Start: 2019-06-24 | End: 2019-10-27 | Stop reason: SDUPTHER

## 2019-06-24 NOTE — ASSESSMENT & PLAN NOTE
Improving, based on history, physical exam and review of pertinent labs, studies and medications; meds reconciled; continue current treatment plan, lifestyle modifications recommended. Key Obesity Meds     Patient is on no anti-obesity meds.         Lab Results   Component Value Date/Time    Glucose 92 12/17/2018 04:04 PM    Cholesterol, total 219 12/17/2018 04:04 PM    HDL Cholesterol 48 12/17/2018 04:04 PM    LDL, calculated 122 12/17/2018 04:04 PM    Triglyceride 244 12/17/2018 04:04 PM    Sodium 136 12/17/2018 04:04 PM    Potassium 4.3 12/17/2018 04:04 PM    ALT (SGPT) 34 12/17/2018 04:04 PM    AST (SGOT) 20 12/17/2018 04:04 PM    VITAMIN D, 25-HYDROXY 69.1 12/17/2018 04:04 PM

## 2019-06-24 NOTE — PROGRESS NOTES
HISTORY OF PRESENT ILLNESS  HPI  Sheila Swift is a 48 y.o. Male with a history of BPH with nocturia, vitamin D deficiency and hyperlipidemia with LDL goal <130, who presents to the office today for a follow up on his cholesterol. Pt states he takes 8000 units of vitamin D a day. Pt notes doing intermittent moderate exercise exercise with no troubles. Pt denies unusual SOB, chest pain, and any recent ER visits or hospitalizations. Past Medical History:   Diagnosis Date    Dyslipidemia, goal LDL below 130     Obesity (BMI 30.0-34.9) 6/17/2018     Past Surgical History:   Procedure Laterality Date    HX OTHER SURGICAL  1998    anal fissure     Current Outpatient Medications on File Prior to Visit   Medication Sig Dispense Refill    multivitamin, stress formula (STRESS TAB) tablet Take 1 Tab by mouth daily.  omega-3 fatty acids-vitamin e (FISH OIL) 1,000 mg Cap Take 4 Caps by mouth.  cholecalciferol, vitamin D3, (VITAMIN D3) 2,000 unit Tab Take 8,000 Units by mouth. No current facility-administered medications on file prior to visit. No Known Allergies  Family History   Problem Relation Age of Onset    Cancer Mother 61        metastatic cancer to abd cavity, unclear primary    Other Father 80    Pancreatic Cancer Maternal Uncle      Social History     Socioeconomic History    Marital status:      Spouse name: Not on file    Number of children: Not on file    Years of education: Not on file    Highest education level: Not on file   Tobacco Use    Smoking status: Never Smoker    Smokeless tobacco: Never Used   Substance and Sexual Activity    Alcohol use:  Yes    Drug use: No   Other Topics Concern     Service Yes    Blood Transfusions No    Caffeine Concern No    Occupational Exposure No    Hobby Hazards No    Sleep Concern No    Stress Concern No    Weight Concern No    Special Diet No    Back Care No    Exercise Yes    Seat Belt Yes    Self-Exams No           Review of Systems   Constitutional: Negative for chills, diaphoresis, fever, malaise/fatigue and weight loss. Eyes: Negative for blurred vision, double vision, pain and redness. Respiratory: Negative for cough, shortness of breath and wheezing. Cardiovascular: Negative for chest pain, palpitations, orthopnea, claudication, leg swelling and PND. Skin: Negative for itching and rash. Neurological: Negative for dizziness, tingling, tremors, sensory change, speech change, focal weakness, seizures, loss of consciousness, weakness and headaches. Results for orders placed or performed in visit on 44/46/64   METABOLIC PANEL, COMPREHENSIVE   Result Value Ref Range    Glucose 99 65 - 99 mg/dL    BUN 26 (H) 6 - 24 mg/dL    Creatinine 1.15 0.76 - 1.27 mg/dL    GFR est non-AA 72 >59 mL/min/1.73    GFR est AA 84 >59 mL/min/1.73    BUN/Creatinine ratio 23 (H) 9 - 20    Sodium 139 134 - 144 mmol/L    Potassium 4.4 3.5 - 5.2 mmol/L    Chloride 108 (H) 96 - 106 mmol/L    CO2 20 20 - 29 mmol/L    Calcium 8.8 8.7 - 10.2 mg/dL    Protein, total 7.0 6.0 - 8.5 g/dL    Albumin 4.5 3.5 - 5.5 g/dL    GLOBULIN, TOTAL 2.5 1.5 - 4.5 g/dL    A-G Ratio 1.8 1.2 - 2.2    Bilirubin, total 0.5 0.0 - 1.2 mg/dL    Alk. phosphatase 67 39 - 117 IU/L    AST (SGOT) 19 0 - 40 IU/L    ALT (SGPT) 28 0 - 44 IU/L   LIPID PANEL   Result Value Ref Range    Cholesterol, total 198 100 - 199 mg/dL    Triglyceride 175 (H) 0 - 149 mg/dL    HDL Cholesterol 47 >39 mg/dL    VLDL, calculated 35 5 - 40 mg/dL    LDL, calculated 116 (H) 0 - 99 mg/dL   CVD REPORT   Result Value Ref Range    INTERPRETATION Note        Physical Exam  Visit Vitals  /78 (BP 1 Location: Right arm, BP Patient Position: Sitting)   Pulse 84   Temp 97.9 °F (36.6 °C) (Oral)   Resp 18   Ht 5' 8.5\" (1.74 m)   Wt 228 lb (103.4 kg)   SpO2 95%   BMI 34.16 kg/m²       Head: Normocephalic, without obvious abnormality, atraumatic  Eyes: conjunctivae/corneas clear. PERRL, EOM's intact. Neck: supple, symmetrical, trachea midline, no adenopathy, thyroid: not enlarged, symmetric, no tenderness/mass/nodules, no carotid bruit and no JVD  Lungs: clear to auscultation bilaterally  Heart: regular rate and rhythm, S1, S2 normal, no murmur, click, rub or gallop  Extremities: extremities normal, atraumatic, no cyanosis or edema  Pulses: 2+ and symmetric  Lymph nodes: Cervical, supraclavicular, and axillary nodes normal.  Neurologic: Grossly normal       ASSESSMENT and PLAN    ICD-10-CM ICD-9-CM    1. Dyslipidemia, goal LDL below 130 A37.2 064.9 METABOLIC PANEL, COMPREHENSIVE      LIPID PANEL   2. Hyperlipidemia with target LDL less than 130 E78.5 272.4 simvastatin (ZOCOR) 20 mg tablet   3. Need for zoster vaccination Z23 V04.89 ZOSTER VACC RECOMBINANT ADJUVANTED   4. Seasonal allergic rhinitis due to pollen J30.1 477.0    5. Obesity (BMI 30.0-34. 9) E66.9 278.00    6. Vitamin D deficiency E55.9 268.9    7. BPH associated with nocturia N40.1 600.01     R35.1 788.43      Diagnoses and all orders for this visit:    1. Dyslipidemia, goal LDL below 456  -     METABOLIC PANEL, COMPREHENSIVE  -     LIPID PANEL    2. Hyperlipidemia with target LDL less than 130  -     simvastatin (ZOCOR) 20 mg tablet; Take 1 Tab by mouth nightly. 3. Need for zoster vaccination  -     ZOSTER VACC RECOMBINANT ADJUVANTED    4. Seasonal allergic rhinitis due to pollen    5. Obesity (BMI 30.0-34.9)    6. Vitamin D deficiency    7. BPH associated with nocturia    Other orders  -     varicella-zoster recombinant, PF, (SHINGRIX, PF,) 50 mcg/0.5 mL susr injection; 0.5 mL by IntraMUSCular route once for 1 dose. -     CVD REPORT      Follow-up and Dispositions    · Return for F/U of obesity, allergic rhinitis, f/u Vitamin D deficiency.        lab results and schedule of future lab studies reviewed with patient  reviewed diet, exercise and weight control  cardiovascular risk and specific lipid/LDL goals reviewed  reviewed medications and side effects in detail  Please call my office if there are any questions- [phone]. I will arrange for follow up after the lab tests done from today return  Recommended a weekly \"heart check. \" I went into detail how to do this. Call for refills on medications as needed. Discussed expected course/resolution/complications of diagnosis in detail with patient. Medication risks/benefits/costs/interactions/alternatives discussed with patient. Pt was given an after visit summary which includes diagnoses, current medications & vitals. Pt expressed understanding with the diagnosis and plan. Total 25 minutes,60 % counseling re: Reviewed symptoms, or lack thereof, of hypertension and elevated cholesterol. Recommended a weekly \"heart check. \" I went into detail how to do this. Regular exercise is very important to your health; it helps mentally, physically, socially; it prevents injuries if done properly. Exercise, even as simple as walking 20-30 minutes daily has major benefits to your health even though your \"numbers\" are the same in the lab. See if you can add this into your daily regimen and after a few months it will become a regular habit-\"just something you do,\" like brushing your teeth. A combination of aerobic exercise and strengthening and stretching is felt to be the best for you, so this should be your ultimate goal.   This can be done in the privacy of your home or in a group setting as at the gym  Some prefer having a , others prefer to do exercise in groups or individually. Do what \"works\" for you. You need to make it simple and \"fun,\" or you most likely you will not continue it. If good Vit D level, recheck yearly and continue same Vit D intake lifelong. BMI is significantly elevated- in the obese range. I reviewed diet, exercise and weight control.  Discussed weight control in detail, the importance of mainly decreased carbs, and for weight maintenance, exercise; discussed different diets and that it isn't as important to watch the type of foods as it is to decrease calorie intake no matter what type of diet you do, etc.     Also, discussed symptoms of concern that were noted today in the note above, treatment options( including doing nothing), when to follow up before recommended time frame. Also, answered all questions. This document was written by Wilbert Ac, as dictated by Mellissa Trejo MD.  I have reviewed and agree with the above note and have made corrections where appropriate Len Roy M.D.

## 2019-06-24 NOTE — PROGRESS NOTES
Chief Complaint   Patient presents with    Cholesterol Problem     6 month OV- pt fasting      1. Have you been to the ER, urgent care clinic since your last visit? Hospitalized since your last visit? No    2. Have you seen or consulted any other health care providers outside of the [address] [place] [address] since your last visit? Include any pap smears or colon screening.  No

## 2019-06-25 LAB
ALBUMIN SERPL-MCNC: 4.5 G/DL (ref 3.5–5.5)
ALBUMIN/GLOB SERPL: 1.8 {RATIO} (ref 1.2–2.2)
ALP SERPL-CCNC: 67 IU/L (ref 39–117)
ALT SERPL-CCNC: 28 IU/L (ref 0–44)
AST SERPL-CCNC: 19 IU/L (ref 0–40)
BILIRUB SERPL-MCNC: 0.5 MG/DL (ref 0–1.2)
BUN SERPL-MCNC: 26 MG/DL (ref 6–24)
BUN/CREAT SERPL: 23 (ref 9–20)
CALCIUM SERPL-MCNC: 8.8 MG/DL (ref 8.7–10.2)
CHLORIDE SERPL-SCNC: 108 MMOL/L (ref 96–106)
CHOLEST SERPL-MCNC: 198 MG/DL (ref 100–199)
CO2 SERPL-SCNC: 20 MMOL/L (ref 20–29)
CREAT SERPL-MCNC: 1.15 MG/DL (ref 0.76–1.27)
GLOBULIN SER CALC-MCNC: 2.5 G/DL (ref 1.5–4.5)
GLUCOSE SERPL-MCNC: 99 MG/DL (ref 65–99)
HDLC SERPL-MCNC: 47 MG/DL
INTERPRETATION, 910389: NORMAL
LDLC SERPL CALC-MCNC: 116 MG/DL (ref 0–99)
POTASSIUM SERPL-SCNC: 4.4 MMOL/L (ref 3.5–5.2)
PROT SERPL-MCNC: 7 G/DL (ref 6–8.5)
SODIUM SERPL-SCNC: 139 MMOL/L (ref 134–144)
TRIGL SERPL-MCNC: 175 MG/DL (ref 0–149)
VLDLC SERPL CALC-MCNC: 35 MG/DL (ref 5–40)

## 2019-06-27 NOTE — PROGRESS NOTES
GREAT NEWS!! All of your recent lab tests are normal or at goal.  No diabetes, normal liver and kidney tests. OK cholesterol numbers that will improve with weight loss. I would continue everything the same and we will see you at your next scheduled follow up in December.

## 2019-10-27 DIAGNOSIS — E78.5 HYPERLIPIDEMIA WITH TARGET LDL LESS THAN 130: ICD-10-CM

## 2019-10-28 RX ORDER — SIMVASTATIN 20 MG/1
TABLET, FILM COATED ORAL
Qty: 90 TAB | Refills: 3 | Status: SHIPPED | OUTPATIENT
Start: 2019-10-28 | End: 2019-12-24 | Stop reason: SDUPTHER

## 2019-12-24 ENCOUNTER — OFFICE VISIT (OUTPATIENT)
Dept: FAMILY MEDICINE CLINIC | Age: 54
End: 2019-12-24

## 2019-12-24 VITALS
TEMPERATURE: 98.2 F | BODY MASS INDEX: 34.66 KG/M2 | HEART RATE: 76 BPM | SYSTOLIC BLOOD PRESSURE: 120 MMHG | DIASTOLIC BLOOD PRESSURE: 86 MMHG | RESPIRATION RATE: 16 BRPM | WEIGHT: 234 LBS | OXYGEN SATURATION: 98 % | HEIGHT: 69 IN

## 2019-12-24 DIAGNOSIS — E78.5 HYPERLIPIDEMIA WITH TARGET LDL LESS THAN 130: ICD-10-CM

## 2019-12-24 DIAGNOSIS — Z00.00 PHYSICAL EXAM: Primary | ICD-10-CM

## 2019-12-24 DIAGNOSIS — R39.11 URINARY HESITANCY: ICD-10-CM

## 2019-12-24 DIAGNOSIS — R35.1 NOCTURIA: ICD-10-CM

## 2019-12-24 DIAGNOSIS — E66.01 SEVERE OBESITY (HCC): ICD-10-CM

## 2019-12-24 RX ORDER — SIMVASTATIN 20 MG/1
TABLET, FILM COATED ORAL
Qty: 90 TAB | Refills: 1 | Status: SHIPPED | OUTPATIENT
Start: 2019-12-24 | End: 2020-05-26

## 2019-12-24 NOTE — PROGRESS NOTES
HISTORY OF PRESENT ILLNESS  HPI  Vazquez Pearce is a 47 y.o. male with a history of BPH with nocturia, vitamin D deficiency and hyperlipidemia with LDL goal <130, who presents to the office today for a complete physical. Pt is fasting today. Pt notes not being able to exercise recently due to a recent tailbone injury after a fall back in Oct.  Pt denies unusual SOB, chest pain, and any recent ER visits or hospitalizations. Past Medical History:   Diagnosis Date    Dyslipidemia, goal LDL below 130     Obesity (BMI 30.0-34.9) 6/17/2018     Past Surgical History:   Procedure Laterality Date    HX OTHER SURGICAL  1998    anal fissure     Current Outpatient Medications on File Prior to Visit   Medication Sig Dispense Refill    multivitamin, stress formula (STRESS TAB) tablet Take 1 Tab by mouth daily.  omega-3 fatty acids-vitamin e (FISH OIL) 1,000 mg Cap Take 4 Caps by mouth.  cholecalciferol, vitamin D3, (VITAMIN D3) 2,000 unit Tab Take 8,000 Units by mouth.  [DISCONTINUED] simvastatin (ZOCOR) 20 mg tablet TAKE 1 TABLET BY MOUTH NIGHTLY 90 Tab 3     No current facility-administered medications on file prior to visit. No Known Allergies  Family History   Problem Relation Age of Onset    Cancer Mother 61        metastatic cancer to abd cavity, unclear primary    Other Father 80    Pancreatic Cancer Maternal Uncle      Social History     Socioeconomic History    Marital status:      Spouse name: Not on file    Number of children: Not on file    Years of education: Not on file    Highest education level: Not on file   Tobacco Use    Smoking status: Never Smoker    Smokeless tobacco: Never Used   Substance and Sexual Activity    Alcohol use:  Yes    Drug use: No    Sexual activity: Yes   Other Topics Concern     Service Yes    Blood Transfusions No    Caffeine Concern No    Occupational Exposure No    Hobby Hazards No    Sleep Concern No    Stress Concern No    Weight Concern No    Special Diet No    Back Care No    Exercise Yes    Seat Belt Yes    Self-Exams No             Review of Systems   Constitutional: Negative for chills, diaphoresis, fever, malaise/fatigue and weight loss. HENT: Negative for congestion, ear discharge, ear pain, hearing loss, nosebleeds, sore throat and tinnitus. Eyes: Negative for blurred vision, double vision, photophobia, pain, discharge and redness. Respiratory: Negative for cough, hemoptysis, sputum production, shortness of breath, wheezing and stridor. Cardiovascular: Negative for chest pain, palpitations, orthopnea, claudication, leg swelling and PND. Gastrointestinal: Negative for abdominal pain, blood in stool, constipation, diarrhea, heartburn, melena, nausea and vomiting. Genitourinary: Negative for dysuria, flank pain, frequency, hematuria and urgency. Musculoskeletal: Negative for back pain, falls, joint pain, myalgias and neck pain. Skin: Negative for itching and rash. Neurological: Negative for dizziness, tingling, tremors, sensory change, speech change, focal weakness, seizures, loss of consciousness, weakness and headaches. Endo/Heme/Allergies: Negative for environmental allergies and polydipsia. Does not bruise/bleed easily. Psychiatric/Behavioral: Negative for depression, hallucinations, memory loss, substance abuse and suicidal ideas. The patient is not nervous/anxious and does not have insomnia.       Results for orders placed or performed in visit on 86/12/21   METABOLIC PANEL, COMPREHENSIVE   Result Value Ref Range    Glucose 99 65 - 99 mg/dL    BUN 26 (H) 6 - 24 mg/dL    Creatinine 1.15 0.76 - 1.27 mg/dL    GFR est non-AA 72 >59 mL/min/1.73    GFR est AA 84 >59 mL/min/1.73    BUN/Creatinine ratio 23 (H) 9 - 20    Sodium 139 134 - 144 mmol/L    Potassium 4.4 3.5 - 5.2 mmol/L    Chloride 108 (H) 96 - 106 mmol/L    CO2 20 20 - 29 mmol/L    Calcium 8.8 8.7 - 10.2 mg/dL    Protein, total 7.0 6.0 - 8.5 g/dL    Albumin 4.5 3.5 - 5.5 g/dL    GLOBULIN, TOTAL 2.5 1.5 - 4.5 g/dL    A-G Ratio 1.8 1.2 - 2.2    Bilirubin, total 0.5 0.0 - 1.2 mg/dL    Alk. phosphatase 67 39 - 117 IU/L    AST (SGOT) 19 0 - 40 IU/L    ALT (SGPT) 28 0 - 44 IU/L   LIPID PANEL   Result Value Ref Range    Cholesterol, total 198 100 - 199 mg/dL    Triglyceride 175 (H) 0 - 149 mg/dL    HDL Cholesterol 47 >39 mg/dL    VLDL, calculated 35 5 - 40 mg/dL    LDL, calculated 116 (H) 0 - 99 mg/dL   CVD REPORT   Result Value Ref Range    INTERPRETATION Note          Physical Exam  Visit Vitals  /86 (BP 1 Location: Right arm, BP Patient Position: Sitting)   Pulse 76   Temp 98.2 °F (36.8 °C) (Oral)   Resp 16   Ht 5' 8.5\" (1.74 m)   Wt 234 lb (106.1 kg)   SpO2 98%   BMI 35.06 kg/m²       General:  Alert, cooperative, no distress, appears stated age. Head:  Normocephalic, without obvious abnormality, atraumatic. Eyes:  Conjunctivae/corneas clear. PERRL, EOMs intact. Fundi benign   Ears:  Normal TMs and external ear canals both ears. Nose: Nares normal. Septum midline. Mucosa normal. No drainage or sinus tenderness. Throat: Lips, mucosa, and tongue normal. Teeth and gums normal.   Neck: Supple, symmetrical, trachea midline, no adenopathy, thyroid: no enlargement/tenderness/nodules, no carotid bruit and no JVD. Back:   Symmetric, no curvature. ROM normal. No CVA tenderness. Lungs:   Clear to auscultation bilaterally. Chest wall:  No tenderness or deformity. Heart:  Regular rate and rhythm, S1, S2 normal, no murmur, click, rub or gallop. Abdomen:   Soft, non-tender. Bowel sounds normal. No masses,  No organomegaly. Genitalia:  Normal male without lesion, discharge or tenderness. Rectal:  Normal tone, normal prostate, no masses or tenderness  Guaiac negative stool. Extremities: Extremities normal, atraumatic, no cyanosis or edema. Pulses: 2+ and symmetric all extremities.    Skin: Skin color, texture, turgor normal. No rashes or lesions   Lymph nodes: Cervical, supraclavicular, and axillary nodes normal.   Neurologic: CNII-XII intact. Normal strength, sensation and reflexes throughout. ASSESSMENT and PLAN    ICD-10-CM ICD-9-CM    1. Physical exam Z00.00 V70.9 LIPID PANEL      METABOLIC PANEL, COMPREHENSIVE      CBC W/O DIFF      URINALYSIS W/ RFLX MICROSCOPIC   2. Urinary hesitancy R39.11 788.64 PSA W/ REFLX FREE PSA   3. Hyperlipidemia with target LDL less than 130 E78.5 272.4 simvastatin (ZOCOR) 20 mg tablet   4. Severe obesity ([address] E66.01 278.01    5. Nocturia R35.1 788.43     1-3 times a night ; decreased urine pressure , half the time     Diagnoses and all orders for this visit:    1. Physical exam  -     LIPID PANEL  -     METABOLIC PANEL, COMPREHENSIVE  -     CBC W/O DIFF  -     URINALYSIS W/ RFLX MICROSCOPIC    2. Urinary hesitancy  -     PSA W/ REFLX FREE PSA    3. Hyperlipidemia with target LDL less than 130  -     simvastatin (ZOCOR) 20 mg tablet; TAKE 1 TABLET BY MOUTH NIGHTLY    4. Severe obesity (HCC)  Assessment & Plan:  Worsening, based on history, physical exam and review of pertinent labs, studies and medications; meds reconciled; continue current treatment plan, lifestyle modifications recommended. Key Obesity Meds     Patient is on no anti-obesity meds.         Lab Results   Component Value Date/Time    Glucose 99 06/24/2019 09:12 AM    Cholesterol, total 198 06/24/2019 09:12 AM    HDL Cholesterol 47 06/24/2019 09:12 AM    LDL, calculated 116 06/24/2019 09:12 AM    Triglyceride 175 06/24/2019 09:12 AM    Sodium 139 06/24/2019 09:12 AM    Potassium 4.4 06/24/2019 09:12 AM    ALT (SGPT) 28 06/24/2019 09:12 AM    AST (SGOT) 19 06/24/2019 09:12 AM    VITAMIN D, 25-HYDROXY 69.1 12/17/2018 04:04 PM             5. Nocturia  Comments:  1-3 times a night ; decreased urine pressure , half the time    Follow-up and Dispositions    · Return in about 6 months (around 6/24/2020), or if nocturia symptoms worsen or fail to improve, for F/U cholesterol, F/U of obesity. lab results and schedule of future lab studies reviewed with patient  reviewed diet, exercise and weight control  cardiovascular risk and specific lipid/LDL goals reviewed  reviewed medications and side effects in detail  Please call my office if there are any questions- [phone]. I will arrange for follow up after the lab tests done from today return  Recommended a weekly \"heart check. \" I went into detail how to do this. Call for refills on medications as needed. Discussed expected course/resolution/complications of diagnosis in detail with patient. Medication risks/benefits/costs/interactions/alternatives discussed with patient. Pt was given an after visit summary which includes diagnoses, current medications & vitals. Pt expressed understanding with the diagnosis and plan. Recommended a weekly \"heart check. \" I went into detail how to do this. Regular exercise is very important to your health; it helps mentally, physically, socially; it prevents injuries if done properly. Exercise, even as simple as walking 20-30 minutes daily has major benefits to your health even though your \"numbers\" are the same in the lab. See if you can add this into your daily regimen and after a few months it will become a regular habit-\"just something you do,\" like brushing your teeth. A combination of aerobic exercise and strengthening and stretching is felt to be the best for you, so this should be your ultimate goal.   This can be done in the privacy of your home or in a group setting as at the gym  Some prefer having a , others prefer to do exercise in groups or individually. Do what \"works\" for you. You need to make it simple and \"fun,\" or you most likely will not continue it. Reviewed symptoms, or lack thereof, of hypertension and elevated cholesterol. BMI is significantly elevated- in the obese range.  I reviewed diet, exercise and weight control. Discussed weight control in detail, the importance of mainly decreased carbs, and for weight maintenance, exercise; discussed different diets and that it isn't as important to watch the type of foods as it is to decrease calorie intake no matter what type of diet you do, etc.     Pt has intermittent troubles with urine flow at night time, some nights getting up only once and having good urine flow with other nights getting up a few times and having decreased urine flow. Suggested that he avoid decongestants and work on stress reduction. If his symptoms become bothersome enough we will try a medication, most likely Flomax as his prostate exam was normal. He would like to avoid medicine at this point. This document was written by Felicia Coffman, as dictated by Armando Hopper MD.  I have reviewed and agree with the above note and have made corrections where appropriate Len Andrade M.D.

## 2019-12-24 NOTE — ASSESSMENT & PLAN NOTE
Worsening, based on history, physical exam and review of pertinent labs, studies and medications; meds reconciled; continue current treatment plan, lifestyle modifications recommended. Key Obesity Meds     Patient is on no anti-obesity meds.         Lab Results   Component Value Date/Time    Glucose 99 06/24/2019 09:12 AM    Cholesterol, total 198 06/24/2019 09:12 AM    HDL Cholesterol 47 06/24/2019 09:12 AM    LDL, calculated 116 06/24/2019 09:12 AM    Triglyceride 175 06/24/2019 09:12 AM    Sodium 139 06/24/2019 09:12 AM    Potassium 4.4 06/24/2019 09:12 AM    ALT (SGPT) 28 06/24/2019 09:12 AM    AST (SGOT) 19 06/24/2019 09:12 AM    VITAMIN D, 25-HYDROXY 69.1 12/17/2018 04:04 PM

## 2019-12-24 NOTE — PROGRESS NOTES
Chief Complaint   Patient presents with    Physical     fasting      1. Have you been to the ER, urgent care clinic since your last visit? Hospitalized since your last visit? No    2. Have you seen or consulted any other health care providers outside of the [address] [place] [address] since your last visit? Include any pap smears or colon screening.  No

## 2019-12-25 LAB
ALBUMIN SERPL-MCNC: 4.5 G/DL (ref 3.5–5.5)
ALBUMIN/GLOB SERPL: 1.9 {RATIO} (ref 1.2–2.2)
ALP SERPL-CCNC: 71 IU/L (ref 39–117)
ALT SERPL-CCNC: 30 IU/L (ref 0–44)
APPEARANCE UR: CLEAR
AST SERPL-CCNC: 19 IU/L (ref 0–40)
BILIRUB SERPL-MCNC: 0.3 MG/DL (ref 0–1.2)
BILIRUB UR QL STRIP: NEGATIVE
BUN SERPL-MCNC: 22 MG/DL (ref 6–24)
BUN/CREAT SERPL: 22 (ref 9–20)
CALCIUM SERPL-MCNC: 9 MG/DL (ref 8.7–10.2)
CHLORIDE SERPL-SCNC: 105 MMOL/L (ref 96–106)
CHOLEST SERPL-MCNC: 185 MG/DL (ref 100–199)
CO2 SERPL-SCNC: 20 MMOL/L (ref 20–29)
COLOR UR: YELLOW
CREAT SERPL-MCNC: 1.01 MG/DL (ref 0.76–1.27)
ERYTHROCYTE [DISTWIDTH] IN BLOOD BY AUTOMATED COUNT: 12.4 % (ref 12.3–15.4)
GLOBULIN SER CALC-MCNC: 2.4 G/DL (ref 1.5–4.5)
GLUCOSE SERPL-MCNC: 94 MG/DL (ref 65–99)
GLUCOSE UR QL: NEGATIVE
HCT VFR BLD AUTO: 44.3 % (ref 37.5–51)
HDLC SERPL-MCNC: 48 MG/DL
HGB BLD-MCNC: 15 G/DL (ref 13–17.7)
HGB UR QL STRIP: NEGATIVE
INTERPRETATION, 910389: NORMAL
KETONES UR QL STRIP: NEGATIVE
LDLC SERPL CALC-MCNC: 106 MG/DL (ref 0–99)
LEUKOCYTE ESTERASE UR QL STRIP: NEGATIVE
MCH RBC QN AUTO: 29.1 PG (ref 26.6–33)
MCHC RBC AUTO-ENTMCNC: 33.9 G/DL (ref 31.5–35.7)
MCV RBC AUTO: 86 FL (ref 79–97)
MICRO URNS: NORMAL
NITRITE UR QL STRIP: NEGATIVE
PH UR STRIP: 5 [PH] (ref 5–7.5)
PLATELET # BLD AUTO: 304 X10E3/UL (ref 150–450)
POTASSIUM SERPL-SCNC: 4.4 MMOL/L (ref 3.5–5.2)
PROT SERPL-MCNC: 6.9 G/DL (ref 6–8.5)
PROT UR QL STRIP: NEGATIVE
PSA SERPL-MCNC: 0.8 NG/ML (ref 0–4)
RBC # BLD AUTO: 5.16 X10E6/UL (ref 4.14–5.8)
REFLEX CRITERIA: NORMAL
SODIUM SERPL-SCNC: 140 MMOL/L (ref 134–144)
SP GR UR: 1.02 (ref 1–1.03)
TRIGL SERPL-MCNC: 153 MG/DL (ref 0–149)
UROBILINOGEN UR STRIP-MCNC: 0.2 MG/DL (ref 0.2–1)
VLDLC SERPL CALC-MCNC: 31 MG/DL (ref 5–40)
WBC # BLD AUTO: 5.9 X10E3/UL (ref 3.4–10.8)

## 2019-12-30 NOTE — PROGRESS NOTES
Good news! ! As you can see above, your lab tests done recently at your physical all came back normal; no signs of diabetes, excellent cholesterol levels, normal liver and kidney function. Normal CBC( red and white blood cells and platelets). I would recommend that you follow up for a full physical every 2 years until the age of 61, and then every year.  The years that you are not having a physical, you will need a short appointment for a prostate exam ; your PSA (prostate cancer check) also came back normal.

## 2020-05-25 DIAGNOSIS — E78.5 HYPERLIPIDEMIA WITH TARGET LDL LESS THAN 130: ICD-10-CM

## 2020-05-26 RX ORDER — SIMVASTATIN 20 MG/1
TABLET, FILM COATED ORAL
Qty: 90 TAB | Refills: 1 | Status: SHIPPED | OUTPATIENT
Start: 2020-05-26 | End: 2020-09-14 | Stop reason: SDUPTHER

## 2020-09-14 ENCOUNTER — OFFICE VISIT (OUTPATIENT)
Dept: FAMILY MEDICINE CLINIC | Age: 55
End: 2020-09-14
Payer: COMMERCIAL

## 2020-09-14 VITALS
RESPIRATION RATE: 16 BRPM | WEIGHT: 228.2 LBS | HEIGHT: 69 IN | HEART RATE: 82 BPM | SYSTOLIC BLOOD PRESSURE: 118 MMHG | TEMPERATURE: 98.6 F | DIASTOLIC BLOOD PRESSURE: 76 MMHG | OXYGEN SATURATION: 97 % | BODY MASS INDEX: 33.8 KG/M2

## 2020-09-14 DIAGNOSIS — Z13.89 SCREENING FOR BLOOD OR PROTEIN IN URINE: ICD-10-CM

## 2020-09-14 DIAGNOSIS — E55.9 VITAMIN D DEFICIENCY: ICD-10-CM

## 2020-09-14 DIAGNOSIS — E78.5 DYSLIPIDEMIA, GOAL LDL BELOW 130: ICD-10-CM

## 2020-09-14 DIAGNOSIS — R35.1 BPH ASSOCIATED WITH NOCTURIA: ICD-10-CM

## 2020-09-14 DIAGNOSIS — R22.2 NODULE OF BUTTOCK: ICD-10-CM

## 2020-09-14 DIAGNOSIS — Z00.00 ANNUAL PHYSICAL EXAM: Primary | ICD-10-CM

## 2020-09-14 DIAGNOSIS — N40.1 BPH ASSOCIATED WITH NOCTURIA: ICD-10-CM

## 2020-09-14 DIAGNOSIS — Z12.11 COLON CANCER SCREENING: ICD-10-CM

## 2020-09-14 DIAGNOSIS — Z12.5 SCREENING FOR MALIGNANT NEOPLASM OF PROSTATE: ICD-10-CM

## 2020-09-14 DIAGNOSIS — E66.01 SEVERE OBESITY (HCC): ICD-10-CM

## 2020-09-14 PROCEDURE — 99396 PREV VISIT EST AGE 40-64: CPT | Performed by: FAMILY MEDICINE

## 2020-09-14 RX ORDER — SIMVASTATIN 20 MG/1
20 TABLET, FILM COATED ORAL DAILY
Qty: 90 TAB | Refills: 1 | Status: SHIPPED | OUTPATIENT
Start: 2020-09-14 | End: 2021-03-12 | Stop reason: SDUPTHER

## 2020-09-14 NOTE — PROGRESS NOTES
Rosana Garcia  54 y.o. male  1965  [address] [place] [address]  [id number]     [address] [place] [address]       Encounter Date: 9/14/2020           Established Patient Visit Note: Steve Goldberg MD    Reason for Appointment:  Chief Complaint   Patient presents with    Cholesterol Problem    Medication Refill    Complete Physical       History of Present Illness:  History provided by patient    Rosana Garcia is a 54 y.o. male who presents to clinic today for:    Annual Physical   Last Physical: 2019  Screenings:  - Colon Cancer Screening: will be due in dec, 2020  - Prostate Cancer Screening: he would like to have this performed  - Depression: denies  Immunizations  - Flu: he will wait for this  - Shingrix: uptodate  Diet: he reports eating eggs/toast for breakfast, he eats a lot of lunch meat, occasional pork/fish/chicken; fast food 1 every other week, soda 2x per week  Exercise: he reports that he needs to do a little more exercise, he used to run but his scheduled changed. Interval History: He reports that urinary hesitancy is about the same and tends to come and go. He has not tried any medicines for this. He has history of low vitamin D, and he takes 8000 units daily. He reports having a small growth in gluteal area. He has had this for a while, and he would like it evalauted further. Review of Systems  All other ROS were reviewed and are negative except as discussed in HPI      Allergies: Patient has no known allergies. Medications: (Updated to reflect final medication list after visit)    Current Outpatient Medications:     simvastatin (ZOCOR) 20 mg tablet, Take 1 Tab by mouth daily. , Disp: 90 Tab, Rfl: 1    multivitamin, stress formula (STRESS TAB) tablet, Take 1 Tab by mouth daily. , Disp: , Rfl:     omega-3 fatty acids-vitamin e (FISH OIL) 1,000 mg Cap, Take 4 Caps by mouth.  , Disp: , Rfl:     cholecalciferol, vitamin D3, (VITAMIN D3) 2,000 unit Tab, Take 8,000 Units by mouth., Disp: , Rfl:     History  Patient Care Team:  August Dumont MD as PCP - General  August Dumont MD as PCP - [facility] Provider    Past Medical History: he has a past medical history of Dyslipidemia, goal LDL below 130 and Obesity (BMI 30.0-34.9) (6/17/2018). He also has no past medical history of Abuse, Anemia NEC, Arrhythmia, Arthritis, Asthma, Autoimmune disease ([address] CAD (coronary artery disease), Calculus of kidney, Cancer ([address] Chronic kidney disease, Chronic pain, Congestive heart failure, unspecified, Contact dermatitis and other eczema, due to unspecified cause, COPD, Depression, Diabetes ([address] GERD (gastroesophageal reflux disease), Headache(784.0), Hypertension, Liver disease, Psychotic disorder ([address] PUD (peptic ulcer disease), Seizures ([address] Stroke ([address] Thromboembolus ([address] Thyroid disease, or Trauma. Past Surgical History: he has a past surgical history that includes hx other surgical (1998). Family Medical History: family history includes Cancer (age of onset: 61) in his mother; Other (age of onset: 80) in his father; Pancreatic Cancer in his maternal uncle. Social History: he reports that he has never smoked. He has never used smokeless tobacco. He reports current alcohol use. He reports that he does not use drugs. There are no preventive care reminders to display for this patient. Objective:   Visit Vitals  /76 (BP 1 Location: Left arm, BP Patient Position: Sitting)   Pulse 82   Temp 98.6 °F (37 °C) (Oral)   Resp 16   Ht 5' 8.5\" (1.74 m)   Wt 228 lb 3.2 oz (103.5 kg)   SpO2 97%   BMI 34.19 kg/m²     Wt Readings from Last 3 Encounters:   09/14/20 228 lb 3.2 oz (103.5 kg)   12/24/19 234 lb (106.1 kg)   06/24/19 228 lb (103.4 kg)       Physical Exam  Exam conducted with a chaperone present. HENT:      Head: Normocephalic and atraumatic.       Right Ear: External ear normal.      Left Ear: External ear normal.      Nose: Nose normal.      Mouth/Throat: Mouth: Mucous membranes are moist.      Pharynx: No oropharyngeal exudate. Eyes:      General: Lids are normal. No scleral icterus. Right eye: No discharge. Left eye: No discharge. Extraocular Movements: Extraocular movements intact. Conjunctiva/sclera: Conjunctivae normal.      Pupils: Pupils are equal, round, and reactive to light. Neck:      Musculoskeletal: Normal range of motion and neck supple. Thyroid: No thyromegaly. Vascular: No JVD. Trachea: No tracheal deviation. Cardiovascular:      Rate and Rhythm: Normal rate and regular rhythm. Pulses:           Radial pulses are 2+ on the right side and 2+ on the left side. Heart sounds: Normal heart sounds. No murmur. No friction rub. No gallop. Pulmonary:      Effort: Pulmonary effort is normal. No respiratory distress. Breath sounds: Normal breath sounds. No stridor. No wheezing. Abdominal:      General: Bowel sounds are normal. There is no distension. Palpations: Abdomen is soft. There is no mass. Tenderness: There is no abdominal tenderness. There is no guarding or rebound. Genitourinary:     Prostate: Enlarged. Not tender and no nodules present. Comments: Small nodule on gluteal cleft  Musculoskeletal: Normal range of motion. General: No tenderness or deformity. Right lower leg: No edema. Left lower leg: No edema. Lymphadenopathy:      Cervical: No cervical adenopathy. Skin:     General: Skin is warm and dry. Neurological:      Mental Status: He is alert. Cranial Nerves: No cranial nerve deficit. Coordination: Coordination normal.      Gait: Gait is intact. Deep Tendon Reflexes: Reflexes normal.   Psychiatric:         Mood and Affect: Mood normal.         Behavior: Behavior normal.         Thought Content: Thought content normal.         Assessment & Plan:      ICD-10-CM ICD-9-CM    1. Annual physical exam  Z00.00 V70.0    2.  Nodule of buttock  R22.2 782.2    3. Mixed hyperlipidemia  E78.2 272.2    4. Severe obesity ([address]  E66.01 278.01    5. BPH associated with nocturia  N40.1 600.01     R35.1 788.43    6. Dyslipidemia, goal LDL below 130  E78.5 272.4 simvastatin (ZOCOR) 20 mg tablet      METABOLIC PANEL, COMPREHENSIVE      CBC WITH AUTOMATED DIFF      LIPID PANEL   7. Vitamin D deficiency  E55.9 268.9 VITAMIN D, 25 HYDROXY   8. Colon cancer screening  Z12.11 V76.51 REFERRAL TO GASTROENTEROLOGY   9. Screening for blood or protein in urine  Z13.89 V82.9 URINALYSIS W/ RFLX MICROSCOPIC   10. Screening for malignant neoplasm of prostate  Z12.5 V76.44 PSA W/ REFLX FREE PSA      Annual Physical Exam: Reviewed and addressed patient's medical history and concerns as discussed in note. Reviewed recommended screenings and immunizations. Discussed recommendations for diet, exercise, and lifestyle.  Gluteal Nodule: discussed referral to dermatology for further evaluation, but patient declines at this time   Obesity: I have reviewed/discussed the above normal BMI with the patient. I have recommended the following interventions: dietary management education, guidance, and counseling, encourage exercise, monitor weight and prescribed dietary intake.  BPH: Chronic, stable. Discussed consideration of flomax should symptoms worsen.  Dyslipidemia: Chronic, stable. Will check labs and continue Zocor   Vit D Deficiency: Chronic, unclear control. Will check Vit D   Prostate Cancer Screening: Discussed risks, benefits, guidelines of checking PSA with patient. After a discussion of uncertainties, risks, and potential benefits of prostate cancer screening, patient today would like to have his PSA checked. I have discussed the diagnosis with the patient and the intended plan as seen in the above orders. The patient has received an after-visit summary along with patient information handout.   I have discussed medication side effects and warnings with the patient as well. Disposition  Follow-up and Dispositions    · Return in about 6 months (around 3/14/2021) for cholesterol.            Hank Turner MD

## 2020-09-14 NOTE — PATIENT INSTRUCTIONS
Tamsulosin (By mouth) Tamsulosin Hydrochloride (egy-QIP-cee-sin brionna-droe-KLOR-oscar) Treats benign prostatic hyperplasia (enlarged prostate). Brand Name(s): Flomax There may be other brand names for this medicine. When This Medicine Should Not Be Used: This medicine is not right for everyone. Do not use it if you had an allergic reaction to tamsulosin. How to Use This Medicine:  
Capsule · Take your medicine as directed. Your dose may need to be changed several times to find what works best for you. · Take this medicine 30 minutes after the same meal each day. Swallow the capsule whole. Do not crush, chew, or open it. · Read and follow the patient instructions that come with this medicine. Talk to your doctor or pharmacist if you have any questions. · Missed dose: Take a dose as soon as you remember. If it is almost time for your next dose, wait until then and take a regular dose. Do not take extra medicine to make up for a missed dose. If you forget to take this medicine for several days in a row, talk to your doctor before you start taking it again. · Store the medicine in a closed container at room temperature, away from heat, moisture, and direct light. Drugs and Foods to Avoid: Ask your doctor or pharmacist before using any other medicine, including over-the-counter medicines, vitamins, and herbal products. · Some medicines can affect how tamsulosin works. Tell your doctor if you are using another alpha blocker medicine, cimetidine, erythromycin, ketoconazole, paroxetine, terbinafine, warfarin, or medicine for erectile dysfunction. Warnings While Using This Medicine: · Tell your doctor if you have kidney disease, liver disease, low blood pressure, prostate cancer, or an allergy to sulfa drugs. · Tell your doctor if you plan to have cataract or glaucoma surgery. This medicine may cause eye problems during surgery. · This medicine may make you dizzy or lightheaded.  Do not drive or do anything else that could be dangerous until you know how this medicine affects you. Stand or sit up slowly if you feel dizzy. · Your doctor will check your progress and the effects of this medicine at regular visits. Keep all appointments. · Keep all medicine out of the reach of children. Never share your medicine with anyone. Possible Side Effects While Using This Medicine:  
Call your doctor right away if you notice any of these side effects: · Allergic reaction: Itching or hives, swelling in your face or hands, swelling or tingling in your mouth or throat, chest tightness, trouble breathing · Blistering, peeling, red skin rash · Lightheadedness, dizziness, fainting · Painful, prolonged erection of your penis If you notice these less serious side effects, talk with your doctor:  
· Headache · Problems with ejaculation · Runny or stuffy nose If you notice other side effects that you think are caused by this medicine, tell your doctor. Call your doctor for medical advice about side effects. You may report side effects to FDA at 6-155-MRX-9110 © 2017 [facility] Information is for End User's use only and may not be sold, redistributed or otherwise used for commercial purposes. The above information is an  only. It is not intended as medical advice for individual conditions or treatments. Talk to your doctor, nurse or pharmacist before following any medical regimen to see if it is safe and effective for you. Tamsulosin (Flomax) - (By mouth) Why this medicine is used:  
Treats benign prostatic hyperplasia (enlarged prostate). Contact a nurse or doctor right away if you have: · Blistering, peeling, red skin rash · Seeing flashes or gomez of light, seeing floating spots · Painful, prolonged erection of your penis · Lightheadedness, dizziness, fainting Common side effects: 
· Headache, backache, weakness · Problems with ejaculation · Runny or stuffy nose © 2017 [facility] Information is for End User's use only and may not be sold, redistributed or otherwise used for commercial purposes. Well Visit, Men 48 to 72: Care Instructions Your Care Instructions Physical exams can help you stay healthy. Your doctor has checked your overall health and may have suggested ways to take good care of yourself. He or she also may have recommended tests. At home, you can help prevent illness with healthy eating, regular exercise, and other steps. Follow-up care is a key part of your treatment and safety. Be sure to make and go to all appointments, and call your doctor if you are having problems. It's also a good idea to know your test results and keep a list of the medicines you take. How can you care for yourself at home? · Reach and stay at a healthy weight. This will lower your risk for many problems, such as obesity, diabetes, heart disease, and high blood pressure. · Get at least 30 minutes of exercise on most days of the week. Walking is a good choice. You also may want to do other activities, such as running, swimming, cycling, or playing tennis or team sports. · Do not smoke. Smoking can make health problems worse. If you need help quitting, talk to your doctor about stop-smoking programs and medicines. These can increase your chances of quitting for good. · Protect your skin from too much sun. When you're outdoors from 10 a.m. to 4 p.m., stay in the shade or cover up with clothing and a hat with a wide brim. Wear sunglasses that block UV rays. Even when it's cloudy, put broad-spectrum sunscreen (SPF 30 or higher) on any exposed skin. · See a dentist one or two times a year for checkups and to have your teeth cleaned. · Wear a seat belt in the car. Follow your doctor's advice about when to have certain tests. These tests can spot problems early. · Cholesterol.  Your doctor will tell you how often to have this done based on your overall health and other things that can increase your risk for heart attack and stroke. · Blood pressure. Have your blood pressure checked during a routine doctor visit. Your doctor will tell you how often to check your blood pressure based on your age, your blood pressure results, and other factors. · Prostate exam. Talk to your doctor about whether you should have a blood test (called a PSA test) for prostate cancer. Experts recommend that you discuss the benefits and risks of the test with your doctor before you decide whether to have this test. 
· Diabetes. Ask your doctor whether you should have tests for diabetes. · Vision. Some experts recommend that you have yearly exams for glaucoma and other age-related eye problems starting at age 48. · Hearing. Tell your doctor if you notice any change in your hearing. You can have tests to find out how well you hear. · Colorectal cancer. Your risk for colorectal cancer gets higher as you get older. Some experts say that adults should start regular screening at age 48 and stop at age 76. Others say to start before age 48 or continue after age 76. Talk with your doctor about your risk and when to start and stop screening. · Heart attack and stroke risk. At least every 4 to 6 years, you should have your risk for heart attack and stroke assessed. Your doctor uses factors such as your age, blood pressure, cholesterol, and whether you smoke or have diabetes to show what your risk for a heart attack or stroke is over the next 10 years. · Abdominal aortic aneurysm. Ask your doctor whether you should have a test to check for an aneurysm. You may need a test if you ever smoked or if your parent, brother, sister, or child has had an aneurysm. When should you call for help? Watch closely for changes in your health, and be sure to contact your doctor if you have any problems or symptoms that concern you. Where can you learn more? Go to http://carson-pb.info/ Enter F435 in the search box to learn more about \"Well Visit, Men 48 to 72: Care Instructions. \" Current as of: May 27, 2020               Content Version: 12.6 © 9751-4570 [employer]. Care instructions adapted under license by [employer] (which disclaims liability or warranty for this information). If you have questions about a medical condition or this instruction, always ask your healthcare professional. [place] [address] any warranty or liability for your use of this information.

## 2020-09-14 NOTE — PROGRESS NOTES
Chief Complaint   Patient presents with    Cholesterol Problem    Medication Refill    Complete Physical     1. Have you been to the ER, urgent care clinic since your last visit? Hospitalized since your last visit? No    2. Have you seen or consulted any other health care providers outside of the [address] [place] [address] since your last visit? Include any pap smears or colon screening. No     Per pt insurance will cover a CPE every calender year.

## 2020-09-17 LAB
25(OH)D3+25(OH)D2 SERPL-MCNC: 80 NG/ML (ref 30–100)
ALBUMIN SERPL-MCNC: 4.7 G/DL (ref 3.8–4.9)
ALBUMIN/GLOB SERPL: 2 {RATIO} (ref 1.2–2.2)
ALP SERPL-CCNC: 69 IU/L (ref 39–117)
ALT SERPL-CCNC: 35 IU/L (ref 0–44)
APPEARANCE UR: CLEAR
AST SERPL-CCNC: 19 IU/L (ref 0–40)
BASOPHILS # BLD AUTO: 0.1 X10E3/UL (ref 0–0.2)
BASOPHILS NFR BLD AUTO: 1 %
BILIRUB SERPL-MCNC: 0.7 MG/DL (ref 0–1.2)
BILIRUB UR QL STRIP: NEGATIVE
BUN SERPL-MCNC: 17 MG/DL (ref 6–24)
BUN/CREAT SERPL: 16 (ref 9–20)
CALCIUM SERPL-MCNC: 9.1 MG/DL (ref 8.7–10.2)
CHLORIDE SERPL-SCNC: 100 MMOL/L (ref 96–106)
CHOLEST SERPL-MCNC: 188 MG/DL (ref 100–199)
CO2 SERPL-SCNC: 23 MMOL/L (ref 20–29)
COLOR UR: YELLOW
CREAT SERPL-MCNC: 1.06 MG/DL (ref 0.76–1.27)
EOSINOPHIL # BLD AUTO: 0.1 X10E3/UL (ref 0–0.4)
EOSINOPHIL NFR BLD AUTO: 1 %
ERYTHROCYTE [DISTWIDTH] IN BLOOD BY AUTOMATED COUNT: 13.2 % (ref 11.6–15.4)
GLOBULIN SER CALC-MCNC: 2.3 G/DL (ref 1.5–4.5)
GLUCOSE SERPL-MCNC: 109 MG/DL (ref 65–99)
GLUCOSE UR QL: NEGATIVE
HCT VFR BLD AUTO: 47.1 % (ref 37.5–51)
HDLC SERPL-MCNC: 47 MG/DL
HGB BLD-MCNC: 16 G/DL (ref 13–17.7)
HGB UR QL STRIP: NEGATIVE
IMM GRANULOCYTES # BLD AUTO: 0 X10E3/UL (ref 0–0.1)
IMM GRANULOCYTES NFR BLD AUTO: 0 %
INTERPRETATION, 910389: NORMAL
KETONES UR QL STRIP: NEGATIVE
LDLC SERPL CALC-MCNC: 110 MG/DL (ref 0–99)
LEUKOCYTE ESTERASE UR QL STRIP: NEGATIVE
LYMPHOCYTES # BLD AUTO: 2.7 X10E3/UL (ref 0.7–3.1)
LYMPHOCYTES NFR BLD AUTO: 37 %
MCH RBC QN AUTO: 30.5 PG (ref 26.6–33)
MCHC RBC AUTO-ENTMCNC: 34 G/DL (ref 31.5–35.7)
MCV RBC AUTO: 90 FL (ref 79–97)
MICRO URNS: ABNORMAL
MONOCYTES # BLD AUTO: 0.7 X10E3/UL (ref 0.1–0.9)
MONOCYTES NFR BLD AUTO: 10 %
NEUTROPHILS # BLD AUTO: 3.7 X10E3/UL (ref 1.4–7)
NEUTROPHILS NFR BLD AUTO: 51 %
NITRITE UR QL STRIP: NEGATIVE
PH UR STRIP: 5 [PH] (ref 5–7.5)
PLATELET # BLD AUTO: 279 X10E3/UL (ref 150–450)
POTASSIUM SERPL-SCNC: 4.1 MMOL/L (ref 3.5–5.2)
PROT SERPL-MCNC: 7 G/DL (ref 6–8.5)
PROT UR QL STRIP: ABNORMAL
PSA SERPL-MCNC: 0.7 NG/ML (ref 0–4)
RBC # BLD AUTO: 5.25 X10E6/UL (ref 4.14–5.8)
REFLEX CRITERIA: NORMAL
SODIUM SERPL-SCNC: 140 MMOL/L (ref 134–144)
SP GR UR: >=1.03 (ref 1–1.03)
TRIGL SERPL-MCNC: 179 MG/DL (ref 0–149)
UROBILINOGEN UR STRIP-MCNC: 0.2 MG/DL (ref 0.2–1)
VLDLC SERPL CALC-MCNC: 31 MG/DL (ref 5–40)
WBC # BLD AUTO: 7.3 X10E3/UL (ref 3.4–10.8)

## 2020-10-15 ENCOUNTER — TELEPHONE (OUTPATIENT)
Dept: FAMILY MEDICINE CLINIC | Age: 55
End: 2020-10-15

## 2020-10-15 NOTE — TELEPHONE ENCOUNTER
I spoke to the wife. 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 I will let Benita Last know and Benita Last will be in touch once she gets a chance to review it. Wife states understanding. She is just very concerned b/c she is getting bills from [facility].  I asked her to give Benita Last until Wed.

## 2020-10-15 NOTE — TELEPHONE ENCOUNTER
Pt wife states the billing was already processed incorrectly on 9/21 so changes should have been made already    Pt wife would really like a call back asap to discuss this and fix the coding     BCB# [phone]

## 2020-10-16 NOTE — TELEPHONE ENCOUNTER
After reviewing dos 09/14/20 pt came in for cpe labs collected on 09/16/20. Did not see dx code Z00.00 on labs. I called lab [facility] spoke janay/Vikki this code was not on file, ZIN#[id number] billed amount $57.15. Can add Z00.00 to claim and resubmit to pt insurance which can take 30-45 days of processing. ..routing info to ROSIE.

## 2020-10-16 NOTE — TELEPHONE ENCOUNTER
Z00.00 is almost never a covered ICD code for labs. Each labs needs to be related to related to a specific screening or diagnosis ( for example, \"screening for lipid disorders\"). If that is not the case for this patient's specific insurance, I would be happy to add the Z00.00 diagnosis code. Please have [facility] send form to add additional diagnosis code.

## 2021-03-12 ENCOUNTER — OFFICE VISIT (OUTPATIENT)
Dept: FAMILY MEDICINE CLINIC | Age: 56
End: 2021-03-12
Payer: COMMERCIAL

## 2021-03-12 VITALS
WEIGHT: 238.4 LBS | BODY MASS INDEX: 35.31 KG/M2 | DIASTOLIC BLOOD PRESSURE: 84 MMHG | RESPIRATION RATE: 16 BRPM | OXYGEN SATURATION: 98 % | HEIGHT: 69 IN | HEART RATE: 82 BPM | SYSTOLIC BLOOD PRESSURE: 122 MMHG | TEMPERATURE: 98.6 F

## 2021-03-12 DIAGNOSIS — R73.01 ELEVATED FASTING GLUCOSE: Primary | ICD-10-CM

## 2021-03-12 DIAGNOSIS — L98.9 SKIN LESION: ICD-10-CM

## 2021-03-12 DIAGNOSIS — Z91.89 H/O MODERATE SUN EXPOSURE: ICD-10-CM

## 2021-03-12 DIAGNOSIS — Z12.11 COLON CANCER SCREENING: ICD-10-CM

## 2021-03-12 DIAGNOSIS — E66.01 SEVERE OBESITY (HCC): ICD-10-CM

## 2021-03-12 DIAGNOSIS — E55.9 VITAMIN D DEFICIENCY: ICD-10-CM

## 2021-03-12 DIAGNOSIS — E78.5 DYSLIPIDEMIA, GOAL LDL BELOW 130: ICD-10-CM

## 2021-03-12 DIAGNOSIS — R22.2 NODULE OF BUTTOCK: ICD-10-CM

## 2021-03-12 DIAGNOSIS — R35.1 BPH ASSOCIATED WITH NOCTURIA: Chronic | ICD-10-CM

## 2021-03-12 DIAGNOSIS — N40.1 BPH ASSOCIATED WITH NOCTURIA: Chronic | ICD-10-CM

## 2021-03-12 PROCEDURE — 99213 OFFICE O/P EST LOW 20 MIN: CPT | Performed by: FAMILY MEDICINE

## 2021-03-12 RX ORDER — SIMVASTATIN 20 MG/1
20 TABLET, FILM COATED ORAL DAILY
Qty: 90 TAB | Refills: 1 | Status: SHIPPED | OUTPATIENT
Start: 2021-03-12 | End: 2021-10-06 | Stop reason: SDUPTHER

## 2021-03-12 NOTE — PROGRESS NOTES
Chief Complaint   Patient presents with    Cholesterol Problem     fasting    Follow-up     6 month     1. Have you been to the ER, urgent care clinic since your last visit? Hospitalized since your last visit? No    2. Have you seen or consulted any other health care providers outside of the [address] [place] [address] since your last visit? Include any pap smears or colon screening.  No    Due colonoscopy

## 2021-03-12 NOTE — PROGRESS NOTES
Robyn Huff  54 y.o. male  1965  [address] [facility]  [id number]     [address] [place] [address]       Encounter Date: 3/12/2021           Established Patient Visit Note: Manisha Desai MD    Reason for Appointment:  Chief Complaint   Patient presents with    Cholesterol Problem     fasting    Follow-up     6 month       History of Present Illness:  History provided by patient    Robyn Huff is a 54 y.o. male who presents to clinic today for:    REports that after last visit his hair styleest noted a black dot on his ear, and he would like to have this evaluated. He states that nodule on gluteal cleft has not changed or become symptomatic. Vitamin D deficiency: at last visit Vit D was elevated, so he decreased his dosing to half . His current dosing of vitamin d is 4000 international units daily  BPH: reports symptom shave improved. Dyslipidemia: reports that he tries to eat healthy but it has been hard  Patient plans to start exercising more and reduce carb intake. Has 2 sodas per week. Review of Systems  Review of Systems   Constitutional: Negative for chills and fever. Respiratory: Negative for cough, shortness of breath and wheezing. Cardiovascular: Negative for chest pain and palpitations. Allergies: Patient has no known allergies. Medications: (Updated to reflect final medication list after visit)    Current Outpatient Medications:     simvastatin (ZOCOR) 20 mg tablet, Take 1 Tab by mouth daily. , Disp: 90 Tab, Rfl: 1    multivitamin, stress formula (STRESS TAB) tablet, Take 1 Tab by mouth daily. , Disp: , Rfl:     omega-3 fatty acids-vitamin e (FISH OIL) 1,000 mg Cap, Take 4 Caps by mouth.  , Disp: , Rfl:     cholecalciferol, vitamin D3, (VITAMIN D3) 2,000 unit Tab, Take 4,000 Units by mouth., Disp: , Rfl:     History  Patient Care Team:  Shannon Hernandez MD as PCP - General  Shannon Hernandez MD as PCP - [facility] Empaneled Provider    Past Medical History: he has a past medical history of Dyslipidemia, goal LDL below 130 and Obesity (BMI 30.0-34.9) (6/17/2018). He also has no past medical history of Abuse, Anemia NEC, Arrhythmia, Arthritis, Asthma, Autoimmune disease ([address] CAD (coronary artery disease), Calculus of kidney, Cancer ([address] Chronic kidney disease, Chronic pain, Congestive heart failure, unspecified, Contact dermatitis and other eczema, due to unspecified cause, COPD, Depression, Diabetes ([address] GERD (gastroesophageal reflux disease), Headache(784.0), Hypertension, Liver disease, Psychotic disorder ([address] PUD (peptic ulcer disease), Seizures ([address] Stroke ([address] Thromboembolus ([address] Thyroid disease, or Trauma. Past Surgical History: he has a past surgical history that includes hx other surgical (1998). Family Medical History: family history includes Cancer (age of onset: 61) in his mother; Other (age of onset: 80) in his father; Pancreatic Cancer in his maternal uncle. Social History: he reports that he has never smoked. He has never used smokeless tobacco. He reports current alcohol use. He reports that he does not use drugs. Health Maintenance Due   Topic Date Due    Colorectal Cancer Screening Combo  12/17/2020       Objective:   Visit Vitals  /84 (BP 1 Location: Left upper arm, BP Patient Position: Sitting)   Pulse 82   Temp 98.6 °F (37 °C) (Oral)   Resp 16   Ht 5' 8.5\" (1.74 m)   Wt 238 lb 6.4 oz (108.1 kg)   SpO2 98%   BMI 35.72 kg/m²     Wt Readings from Last 3 Encounters:   03/12/21 238 lb 6.4 oz (108.1 kg)   09/14/20 228 lb 3.2 oz (103.5 kg)   12/24/19 234 lb (106.1 kg)       Physical Exam  Constitutional:       Appearance: Normal appearance. HENT:      Head: Normocephalic and atraumatic. Right Ear: External ear normal.      Left Ear: External ear normal.      Ears:        Nose: Nose normal.      Mouth/Throat:      Pharynx: No oropharyngeal exudate. Eyes:      General: No scleral icterus.         Right eye: No discharge. Left eye: No discharge. Conjunctiva/sclera: Conjunctivae normal.      Pupils: Pupils are equal, round, and reactive to light. Neck:      Musculoskeletal: Normal range of motion and neck supple. Thyroid: No thyromegaly. Vascular: No JVD. Trachea: No tracheal deviation. Cardiovascular:      Rate and Rhythm: Normal rate and regular rhythm. Heart sounds: Normal heart sounds. No murmur. No friction rub. No gallop. Pulmonary:      Effort: Pulmonary effort is normal. No respiratory distress. Breath sounds: Normal breath sounds. No stridor. No wheezing. Musculoskeletal: Normal range of motion. General: No tenderness or deformity. Lymphadenopathy:      Cervical: No cervical adenopathy. Skin:     General: Skin is warm and dry. Neurological:      Mental Status: He is alert. Cranial Nerves: No cranial nerve deficit. Coordination: Coordination normal.      Gait: Gait is intact. Deep Tendon Reflexes: Reflexes normal.         Assessment & Plan:      ICD-10-CM ICD-9-CM    1. Elevated fasting glucose  R73.01 790.21 HEMOGLOBIN A1C WITH EAG      URINALYSIS W/ RFLX MICROSCOPIC      HEMOGLOBIN A1C WITH EAG      URINALYSIS W/ RFLX MICROSCOPIC   2. Severe obesity ([address]  E66.01 278.01    3. Dyslipidemia, goal LDL below 130  E78.5 272.4 LIPID PANEL      METABOLIC PANEL, COMPREHENSIVE      TSH 3RD GENERATION      CBC W/O DIFF      simvastatin (ZOCOR) 20 mg tablet      LIPID PANEL      METABOLIC PANEL, COMPREHENSIVE      TSH 3RD GENERATION      CBC W/O DIFF   4. BPH associated with nocturia  N40.1 600.01     R35.1 788.43    5. Vitamin D deficiency  E55.9 268.9 VITAMIN D, 25 HYDROXY      VITAMIN D, 25 HYDROXY   6. H/O moderate sun exposure  Z91.89 V15.89 REFERRAL TO DERMATOLOGY   7. Colon cancer screening  Z12.11 V76.51 OCCULT BLOOD, STOOL   8.  Skin lesion  L98.9 709.9 REFERRAL TO DERMATOLOGY   9. Nodule of buttock  R22.2 782.2 REFERRAL TO DERMATOLOGY · Elevated Fasting Glucose: Chronic, unclear status. Check labs  · Obesity: I have reviewed/discussed the above normal BMI with the patient. I have recommended the following interventions: dietary management education, guidance, and counseling, encourage exercise, monitor weight and prescribed dietary intake. · Dyslipidemia: Chronic, unclear status. Check labs and continue Zocor  · BPH: Chronic, stable. Continue to monitor symptoms  · Vit D Deficiency: Chronic, unclear control. Check labs  · H/O sun Exposure/Skin Lesion/Nodule Of Buttock: Chronic, stable. Referral to dermatology    I have discussed the diagnosis with the patient and the intended plan as seen in the above orders. The patient has received an after-visit summary along with patient information handout. I have discussed medication side effects and warnings with the patient as well. Disposition  Follow-up and Dispositions    · Return in about 6 months (around 9/12/2021).            Brittney Parrish MD

## 2021-03-15 LAB
25(OH)D3+25(OH)D2 SERPL-MCNC: 63.8 NG/ML (ref 30–100)
ALBUMIN SERPL-MCNC: 4.8 G/DL (ref 3.8–4.9)
ALBUMIN/GLOB SERPL: 2 {RATIO} (ref 1.2–2.2)
ALP SERPL-CCNC: 73 IU/L (ref 39–117)
ALT SERPL-CCNC: 33 IU/L (ref 0–44)
APPEARANCE UR: CLEAR
AST SERPL-CCNC: 19 IU/L (ref 0–40)
BILIRUB SERPL-MCNC: 0.5 MG/DL (ref 0–1.2)
BILIRUB UR QL STRIP: NEGATIVE
BUN SERPL-MCNC: 21 MG/DL (ref 6–24)
BUN/CREAT SERPL: 21 (ref 9–20)
CALCIUM SERPL-MCNC: 8.9 MG/DL (ref 8.7–10.2)
CHLORIDE SERPL-SCNC: 105 MMOL/L (ref 96–106)
CHOLEST SERPL-MCNC: 185 MG/DL (ref 100–199)
CO2 SERPL-SCNC: 19 MMOL/L (ref 20–29)
COLOR UR: YELLOW
CREAT SERPL-MCNC: 1 MG/DL (ref 0.76–1.27)
ERYTHROCYTE [DISTWIDTH] IN BLOOD BY AUTOMATED COUNT: 12.7 % (ref 11.6–15.4)
EST. AVERAGE GLUCOSE BLD GHB EST-MCNC: 111 MG/DL
GLOBULIN SER CALC-MCNC: 2.4 G/DL (ref 1.5–4.5)
GLUCOSE SERPL-MCNC: 100 MG/DL (ref 65–99)
GLUCOSE UR QL: NEGATIVE
HBA1C MFR BLD: 5.5 % (ref 4.8–5.6)
HCT VFR BLD AUTO: 45.5 % (ref 37.5–51)
HDLC SERPL-MCNC: 48 MG/DL
HGB BLD-MCNC: 15.6 G/DL (ref 13–17.7)
HGB UR QL STRIP: NEGATIVE
IMP & REVIEW OF LAB RESULTS: NORMAL
KETONES UR QL STRIP: NEGATIVE
LDLC SERPL CALC-MCNC: 108 MG/DL (ref 0–99)
LEUKOCYTE ESTERASE UR QL STRIP: NEGATIVE
MCH RBC QN AUTO: 29.3 PG (ref 26.6–33)
MCHC RBC AUTO-ENTMCNC: 34.3 G/DL (ref 31.5–35.7)
MCV RBC AUTO: 86 FL (ref 79–97)
MICRO URNS: NORMAL
NITRITE UR QL STRIP: NEGATIVE
PH UR STRIP: 5 [PH] (ref 5–7.5)
PLATELET # BLD AUTO: 281 X10E3/UL (ref 150–450)
POTASSIUM SERPL-SCNC: 4.5 MMOL/L (ref 3.5–5.2)
PROT SERPL-MCNC: 7.2 G/DL (ref 6–8.5)
PROT UR QL STRIP: NEGATIVE
RBC # BLD AUTO: 5.32 X10E6/UL (ref 4.14–5.8)
SODIUM SERPL-SCNC: 140 MMOL/L (ref 134–144)
SP GR UR: 1.02 (ref 1–1.03)
SPECIMEN STATUS REPORT, ROLRST: NORMAL
TRIGL SERPL-MCNC: 167 MG/DL (ref 0–149)
TSH SERPL DL<=0.005 MIU/L-ACNC: 2.56 UIU/ML (ref 0.45–4.5)
UROBILINOGEN UR STRIP-MCNC: 0.2 MG/DL (ref 0.2–1)
VLDLC SERPL CALC-MCNC: 29 MG/DL (ref 5–40)
WBC # BLD AUTO: 5 X10E3/UL (ref 3.4–10.8)

## 2021-09-28 ENCOUNTER — TELEPHONE (OUTPATIENT)
Dept: FAMILY MEDICINE CLINIC | Age: 56
End: 2021-09-28

## 2021-09-29 ENCOUNTER — OFFICE VISIT (OUTPATIENT)
Dept: FAMILY MEDICINE CLINIC | Age: 56
End: 2021-09-29
Payer: COMMERCIAL

## 2021-09-29 VITALS
HEIGHT: 69 IN | HEART RATE: 86 BPM | DIASTOLIC BLOOD PRESSURE: 80 MMHG | TEMPERATURE: 98.4 F | OXYGEN SATURATION: 95 % | BODY MASS INDEX: 34.66 KG/M2 | RESPIRATION RATE: 16 BRPM | WEIGHT: 234 LBS | SYSTOLIC BLOOD PRESSURE: 117 MMHG

## 2021-09-29 DIAGNOSIS — Z12.11 COLON CANCER SCREENING: ICD-10-CM

## 2021-09-29 DIAGNOSIS — E55.9 VITAMIN D DEFICIENCY: ICD-10-CM

## 2021-09-29 DIAGNOSIS — E78.5 DYSLIPIDEMIA, GOAL LDL BELOW 130: ICD-10-CM

## 2021-09-29 DIAGNOSIS — R73.01 ELEVATED FASTING GLUCOSE: ICD-10-CM

## 2021-09-29 DIAGNOSIS — Z13.89 SCREENING FOR BLOOD OR PROTEIN IN URINE: ICD-10-CM

## 2021-09-29 DIAGNOSIS — R35.1 BPH ASSOCIATED WITH NOCTURIA: ICD-10-CM

## 2021-09-29 DIAGNOSIS — Z00.00 ANNUAL PHYSICAL EXAM: Primary | ICD-10-CM

## 2021-09-29 DIAGNOSIS — N40.1 BPH ASSOCIATED WITH NOCTURIA: ICD-10-CM

## 2021-09-29 DIAGNOSIS — Z12.5 SCREENING PSA (PROSTATE SPECIFIC ANTIGEN): ICD-10-CM

## 2021-09-29 DIAGNOSIS — Z13.29 SCREENING FOR THYROID DISORDER: ICD-10-CM

## 2021-09-29 DIAGNOSIS — Z13.0 SCREENING FOR DEFICIENCY ANEMIA: ICD-10-CM

## 2021-09-29 PROCEDURE — 99396 PREV VISIT EST AGE 40-64: CPT | Performed by: FAMILY MEDICINE

## 2021-09-29 NOTE — PROGRESS NOTES
Chief Complaint   Patient presents with    Complete Physical        1. \"Have you been to the ER, urgent care clinic since your last visit? Hospitalized since your last visit? \" No    2. \"Have you seen or consulted any other health care providers outside of the [address] [place] [address] since your last visit? \" No     3. For patients aged 39-70: Has the patient had a colonoscopy? No     If the patient is female:    4. For patients aged 41-77: Has the patient had a mammogram within the past 2 years? No    5. For patients aged 21-30: Has the patient had a pap smear?  No    3 most recent PHQ Screens 9/29/2021   Little interest or pleasure in doing things Not at all   Feeling down, depressed, irritable, or hopeless Not at all   Total Score PHQ 2 0

## 2021-09-29 NOTE — PATIENT INSTRUCTIONS
Well Visit, Men 48 to 72: Care Instructions  Overview     Well visits can help you stay healthy. Your doctor has checked your overall health and may have suggested ways to take good care of yourself. Your doctor also may have recommended tests. At home, you can help prevent illness with healthy eating, regular exercise, and other steps. Follow-up care is a key part of your treatment and safety. Be sure to make and go to all appointments, and call your doctor if you are having problems. It's also a good idea to know your test results and keep a list of the medicines you take. How can you care for yourself at home? · Get screening tests that you and your doctor decide on. Screening helps find diseases before any symptoms appear. · Eat healthy foods. Choose fruits, vegetables, whole grains, protein, and low-fat dairy foods. Limit fat, especially saturated fat. Reduce salt in your diet. · Limit alcohol. Have no more than 2 drinks a day or 14 drinks a week. · Get at least 30 minutes of exercise on most days of the week. Walking is a good choice. You also may want to do other activities, such as running, swimming, cycling, or playing tennis or team sports. · Reach and stay at a healthy weight. This will lower your risk for many problems, such as obesity, diabetes, heart disease, and high blood pressure. · Do not smoke. Smoking can make health problems worse. If you need help quitting, talk to your doctor about stop-smoking programs and medicines. These can increase your chances of quitting for good. · Care for your mental health. It is easy to get weighed down by worry and stress. Learn strategies to manage stress, like deep breathing and mindfulness, and stay connected with your family and community. If you find you often feel sad or hopeless, talk with your doctor. Treatment can help. · Talk to your doctor about whether you have any risk factors for sexually transmitted infections (STIs).  You can help prevent STIs if you wait to have sex with a new partner (or partners) until you've each been tested for STIs. It also helps if you use condoms (male or female condoms) and if you limit your sex partners to one person who only has sex with you. Vaccines are available for some STIs. · If it's important to you to prevent pregnancy with your partner, talk with your doctor about birth control options that might be best for you. · If you think you may have a problem with alcohol or drug use, talk to your doctor. This includes prescription medicines (such as amphetamines and opioids) and illegal drugs (such as cocaine and methamphetamine). Your doctor can help you figure out what type of treatment is best for you. · Protect your skin from too much sun. When you're outdoors from 10 a.m. to 4 p.m., stay in the shade or cover up with clothing and a hat with a wide brim. Wear sunglasses that block UV rays. Even when it's cloudy, put broad-spectrum sunscreen (SPF 30 or higher) on any exposed skin. · See a dentist one or two times a year for checkups and to have your teeth cleaned. · Wear a seat belt in the car. When should you call for help? Watch closely for changes in your health, and be sure to contact your doctor if you have any problems or symptoms that concern you. Where can you learn more? Go to http://www.gray.com/  Enter V428 in the search box to learn more about \"Well Visit, Men 48 to 72: Care Instructions. \"  Current as of: February 11, 2021               Content Version: 13.0  © 7165-4308 [employer]wise, Incorporated. Care instructions adapted under license by [employer] (which disclaims liability or warranty for this information). If you have questions about a medical condition or this instruction, always ask your healthcare professional. [address] any warranty or liability for your use of this information.

## 2021-09-29 NOTE — PROGRESS NOTES
Bc Bernabe  64 y.o. male  1965  [address] [address]  [id number]     [address] [place] [address] PRACTICE       Encounter Date: 9/29/2021           Established Patient Visit Note: Lexus Bass MD    Reason for Appointment:  Chief Complaint   Patient presents with    Complete Physical       History of Present Illness:  History provided by patient    Bc Bernabe is a 64 y.o. male who presents to clinic today for:    Annual Physical  · Last physical > 1 year ago  · Elevated Fasting Glucose: last A1c was 5.5  · Obesity: he is working to lose weight, he is cutting back on calories and he is exercising. · Dyslipidemia: Last LDL slightly above goal; patient taking Zocor with no reported side effects; he plans to start working a little more on diet and exercise  · BPH with nocturia: he reports as stable  · Vit D Deficiency: last vit D level was 63.8; taking Vit D 4000 international units  daily  · H/O sun Exposure/Skin Lesion/Nodule Of Buttock: patient referred to drematology at last visit, but he has not yet scheduled an appointmetn d/t being busy. He does plan to schedule this apt. · CCS: he was given referral to GI in March, 2021, but he has not yet scheduled this apt. He did submit a stool test to [facility], but it does not appear that this was processed or resulted. Today, he states that he is interested in Cologuard for colon cancer screening. · He reports that he received his flu shot. He also reports receiving the shingles vaccine and [facility]. Review of Systems  All other ROS were reviewed and are negative except as discussed in HPI        Allergies: Patient has no known allergies. Medications: (Updated to reflect final medication list after visit)    Current Outpatient Medications:     simvastatin (ZOCOR) 20 mg tablet, Take 1 Tab by mouth daily. , Disp: 90 Tab, Rfl: 1    multivitamin, stress formula (STRESS TAB) tablet, Take 1 Tab by mouth daily. , Disp: , Rfl:    omega-3 fatty acids-vitamin e (FISH OIL) 1,000 mg Cap, Take 4 Caps by mouth.  , Disp: , Rfl:     cholecalciferol, vitamin D3, (VITAMIN D3) 2,000 unit Tab, Take 4,000 Units by mouth., Disp: , Rfl:     History  Patient Care Team:  Bay Cruz MD as PCP - General (Family Medicine)  Bay Cruz MD as PCP [facility]    Past Medical History: he has a past medical history of Dyslipidemia, goal LDL below 130 and Obesity (BMI 30.0-34.9) (6/17/2018). He also has no past medical history of Abuse, Anemia NEC, Arrhythmia, Arthritis, Asthma, Autoimmune disease ([address] CAD (coronary artery disease), Calculus of kidney, Cancer ([address] Chronic kidney disease, Chronic pain, Congestive heart failure, unspecified, Contact dermatitis and other eczema, due to unspecified cause, COPD, Depression, Diabetes ([address] GERD (gastroesophageal reflux disease), Headache(784.0), Hypertension, Liver disease, Psychotic disorder ([address] PUD (peptic ulcer disease), Seizures ([address] Stroke ([address] Thromboembolus ([address] Thyroid disease, or Trauma. Past Surgical History: he has a past surgical history that includes hx other surgical (1998). Family Medical History: family history includes Cancer (age of onset: 61) in his mother; Other (age of onset: 80) in his father; Pancreatic Cancer in his maternal uncle. Social History: he reports that he has never smoked. He has never used smokeless tobacco. He reports current alcohol use. He reports that he does not use drugs.     Health Maintenance Due   Topic Date Due    Shingrix Vaccine Age 49> (2 of 2) 02/13/2020    Colorectal Cancer Screening Combo  12/17/2020    Flu Vaccine (1) 09/01/2021       Objective:   Visit Vitals  /80 (BP 1 Location: Left arm, BP Patient Position: Sitting, BP Cuff Size: Adult)   Pulse 86   Temp 98.4 °F (36.9 °C)   Resp 16   Ht 5' 8.5\" (1.74 m)   Wt 234 lb (106.1 kg)   SpO2 95%   BMI 35.06 kg/m²     Wt Readings from Last 3 Encounters:   09/29/21 234 lb (106.1 kg)   03/12/21 238 lb 6.4 oz (108.1 kg)   09/14/20 228 lb 3.2 oz (103.5 kg)       Physical Exam  HENT:      Head: Normocephalic and atraumatic. Right Ear: External ear normal.      Left Ear: External ear normal.      Nose: Nose normal.      Mouth/Throat:      Mouth: Mucous membranes are moist.      Pharynx: No oropharyngeal exudate. Eyes:      General: Lids are normal. No scleral icterus. Right eye: No discharge. Left eye: No discharge. Extraocular Movements: Extraocular movements intact. Conjunctiva/sclera: Conjunctivae normal.      Pupils: Pupils are equal, round, and reactive to light. Neck:      Thyroid: No thyromegaly. Vascular: No JVD. Trachea: No tracheal deviation. Cardiovascular:      Rate and Rhythm: Normal rate and regular rhythm. Pulses:           Radial pulses are 2+ on the right side and 2+ on the left side. Heart sounds: Normal heart sounds. No murmur heard. No friction rub. No gallop. Pulmonary:      Effort: Pulmonary effort is normal. No respiratory distress. Breath sounds: Normal breath sounds. No stridor. No wheezing. Abdominal:      General: Bowel sounds are normal. There is no distension. Palpations: Abdomen is soft. There is no mass. Tenderness: There is no abdominal tenderness. There is no guarding or rebound. Musculoskeletal:         General: No tenderness or deformity. Normal range of motion. Cervical back: Normal range of motion and neck supple. Right lower leg: No edema. Left lower leg: No edema. Lymphadenopathy:      Cervical: No cervical adenopathy. Skin:     General: Skin is warm and dry. Neurological:      Mental Status: He is alert. Cranial Nerves: No cranial nerve deficit. Coordination: Coordination normal.      Gait: Gait is intact.       Deep Tendon Reflexes: Reflexes normal.   Psychiatric:         Mood and Affect: Mood normal.         Behavior: Behavior normal.         Thought Content: Thought content normal.         Assessment & Plan:      ICD-10-CM ICD-9-CM    1. Annual physical exam  Z00.00 V70.0    2. Screening PSA (prostate specific antigen)  Z12.5 V76.44 PSA SCREENING (SCREENING)      PSA SCREENING (SCREENING)   3. BPH associated with nocturia  N40.1 600.01     R35.1 788.43    4. Dyslipidemia, goal LDL below 130  E78.5 272.4 LIPID PANEL      METABOLIC PANEL, COMPREHENSIVE      LIPID PANEL      METABOLIC PANEL, COMPREHENSIVE   5. Vitamin D deficiency  E55.9 268.9 VITAMIN D, 25 HYDROXY      VITAMIN D, 25 HYDROXY   6. Elevated fasting glucose  R73.01 790.21 HEMOGLOBIN A1C WITH EAG      HEMOGLOBIN A1C WITH EAG   7. Colon cancer screening  Z12.11 V76.51 COLOGUARD TEST (FECAL DNA COLORECTAL CANCER SCREENING)   8. Screening for deficiency anemia  Z13.0 V78.1 CBC W/O DIFF      CBC W/O DIFF   9. Screening for blood or protein in urine  Z13.89 V82.9 URINALYSIS W/ RFLX MICROSCOPIC      URINALYSIS W/ RFLX MICROSCOPIC   10. Screening for thyroid disorder  Z13.29 V77.0 TSH 3RD GENERATION      TSH 3RD GENERATION      Annual Physical Exam: Reviewed and addressed patient's medical history and concerns as discussed in note. Reviewed recommended screenings and immunizations. Discussed recommendations for diet, exercise, and lifestyle. · Elevated Fasting Glucose: Chronic, stable. Continue to monitor. · Obesity: I have reviewed/discussed the above normal BMI with the patient. I have recommended the following interventions: dietary management education, guidance, and counseling, encourage exercise, monitor weight and prescribed dietary intake. · Dyslipidemia: Chronic, uncontrolled. Discussed diet and exercise recommendations. Continue to monitor. Continue statin. · BPH with nocturia: Chronic, stable. Continue to monitor. · Vit D Deficiency:Chronic, stable. Continue current therapy. · H/O sun Exposure/Skin Lesion/Nodule Of Buttock: Chronic, uncontrolled.  Advised patient to schedule visit with dermatology as previously discussed. Patient expresses understanding. · CCS: Colon Cancer Screening: Patient due for colon cancer screening. Discussed recommendations, risks/benefits, and options for screening. Patient agrees to cologuard for colon cancer screening. I have discussed the diagnosis with the patient and the intended plan as seen in the above orders. The patient has received an after-visit summary along with patient information handout. I have discussed medication side effects and warnings with the patient as well. Disposition  Follow-up and Dispositions    · Return in about 6 months (around 3/29/2022).            Walter Garcias MD

## 2021-09-30 LAB
25(OH)D3+25(OH)D2 SERPL-MCNC: 61.4 NG/ML (ref 30–100)
ALBUMIN SERPL-MCNC: 4.5 G/DL (ref 3.8–4.9)
ALBUMIN/GLOB SERPL: 2 {RATIO} (ref 1.2–2.2)
ALP SERPL-CCNC: 78 IU/L (ref 44–121)
ALT SERPL-CCNC: 37 IU/L (ref 0–44)
APPEARANCE UR: CLEAR
AST SERPL-CCNC: 20 IU/L (ref 0–40)
BILIRUB SERPL-MCNC: 0.5 MG/DL (ref 0–1.2)
BILIRUB UR QL STRIP: NEGATIVE
BUN SERPL-MCNC: 18 MG/DL (ref 6–24)
BUN/CREAT SERPL: 17 (ref 9–20)
CALCIUM SERPL-MCNC: 9.2 MG/DL (ref 8.7–10.2)
CHLORIDE SERPL-SCNC: 103 MMOL/L (ref 96–106)
CHOLEST SERPL-MCNC: 197 MG/DL (ref 100–199)
CO2 SERPL-SCNC: 22 MMOL/L (ref 20–29)
COLOR UR: YELLOW
CREAT SERPL-MCNC: 1.06 MG/DL (ref 0.76–1.27)
ERYTHROCYTE [DISTWIDTH] IN BLOOD BY AUTOMATED COUNT: 12.8 % (ref 11.6–15.4)
EST. AVERAGE GLUCOSE BLD GHB EST-MCNC: 111 MG/DL
GLOBULIN SER CALC-MCNC: 2.3 G/DL (ref 1.5–4.5)
GLUCOSE SERPL-MCNC: 99 MG/DL (ref 65–99)
GLUCOSE UR QL: NEGATIVE
HBA1C MFR BLD: 5.5 % (ref 4.8–5.6)
HCT VFR BLD AUTO: 47.8 % (ref 37.5–51)
HDLC SERPL-MCNC: 50 MG/DL
HGB BLD-MCNC: 15.7 G/DL (ref 13–17.7)
HGB UR QL STRIP: NEGATIVE
IMP & REVIEW OF LAB RESULTS: NORMAL
KETONES UR QL STRIP: NEGATIVE
LDLC SERPL CALC-MCNC: 105 MG/DL (ref 0–99)
LEUKOCYTE ESTERASE UR QL STRIP: NEGATIVE
MCH RBC QN AUTO: 29.7 PG (ref 26.6–33)
MCHC RBC AUTO-ENTMCNC: 32.8 G/DL (ref 31.5–35.7)
MCV RBC AUTO: 90 FL (ref 79–97)
MICRO URNS: NORMAL
NITRITE UR QL STRIP: NEGATIVE
PH UR STRIP: 5 [PH] (ref 5–7.5)
PLATELET # BLD AUTO: 262 X10E3/UL (ref 150–450)
POTASSIUM SERPL-SCNC: 4.5 MMOL/L (ref 3.5–5.2)
PROT SERPL-MCNC: 6.8 G/DL (ref 6–8.5)
PROT UR QL STRIP: NEGATIVE
PSA SERPL-MCNC: 0.9 NG/ML (ref 0–4)
RBC # BLD AUTO: 5.29 X10E6/UL (ref 4.14–5.8)
SODIUM SERPL-SCNC: 138 MMOL/L (ref 134–144)
SP GR UR: 1.03 (ref 1–1.03)
SPECIMEN STATUS REPORT, ROLRST: NORMAL
TRIGL SERPL-MCNC: 246 MG/DL (ref 0–149)
TSH SERPL DL<=0.005 MIU/L-ACNC: 3.03 UIU/ML (ref 0.45–4.5)
UROBILINOGEN UR STRIP-MCNC: 0.2 MG/DL (ref 0.2–1)
VLDLC SERPL CALC-MCNC: 42 MG/DL (ref 5–40)
WBC # BLD AUTO: 6.8 X10E3/UL (ref 3.4–10.8)

## 2021-10-06 DIAGNOSIS — E78.5 DYSLIPIDEMIA, GOAL LDL BELOW 130: ICD-10-CM

## 2021-10-06 RX ORDER — SIMVASTATIN 20 MG/1
20 TABLET, FILM COATED ORAL DAILY
Qty: 90 TABLET | Refills: 1 | Status: SHIPPED | OUTPATIENT
Start: 2021-10-06 | End: 2022-03-28 | Stop reason: SDUPTHER

## 2021-10-06 NOTE — TELEPHONE ENCOUNTER
[facility] has requested a new prescription on the pt's behalf via fax. Previous prescription was sent to [facility]. Please review. Last visit 09/29/2021 MD He Quinones   Next appointment 6 months (03/2022)   Previous refill encounter(s)   03/12/2021 Zocor #90 with 1 refill     Requested Prescriptions     Pending Prescriptions Disp Refills    simvastatin (ZOCOR) 20 mg tablet 90 Tablet 1     Sig: Take 1 Tablet by mouth daily.

## 2021-12-06 ENCOUNTER — TELEPHONE (OUTPATIENT)
Dept: FAMILY MEDICINE CLINIC | Age: 56
End: 2021-12-06

## 2021-12-06 ENCOUNTER — VIRTUAL VISIT (OUTPATIENT)
Dept: FAMILY MEDICINE CLINIC | Age: 56
End: 2021-12-06
Payer: COMMERCIAL

## 2021-12-06 DIAGNOSIS — M54.50 ACUTE RIGHT-SIDED LOW BACK PAIN WITHOUT SCIATICA: Primary | ICD-10-CM

## 2021-12-06 PROCEDURE — 99213 OFFICE O/P EST LOW 20 MIN: CPT | Performed by: FAMILY MEDICINE

## 2021-12-06 RX ORDER — DICLOFENAC SODIUM 75 MG/1
75 TABLET, DELAYED RELEASE ORAL
Qty: 20 TABLET | Refills: 0 | Status: SHIPPED | OUTPATIENT
Start: 2021-12-06 | End: 2021-12-06 | Stop reason: SDUPTHER

## 2021-12-06 RX ORDER — CYCLOBENZAPRINE HCL 5 MG
5 TABLET ORAL
Qty: 21 TABLET | Refills: 0 | Status: SHIPPED | OUTPATIENT
Start: 2021-12-06 | End: 2022-08-08

## 2021-12-06 RX ORDER — DICLOFENAC SODIUM 75 MG/1
75 TABLET, DELAYED RELEASE ORAL
Qty: 20 TABLET | Refills: 0 | Status: SHIPPED | OUTPATIENT
Start: 2021-12-06 | End: 2022-08-08

## 2021-12-06 RX ORDER — CYCLOBENZAPRINE HCL 5 MG
5 TABLET ORAL
Qty: 21 TABLET | Refills: 0 | Status: SHIPPED | OUTPATIENT
Start: 2021-12-06 | End: 2021-12-06 | Stop reason: SDUPTHER

## 2021-12-06 NOTE — LETTER
NOTIFICATION RETURN TO WORK     12/6/2021 5:51 PM    Mr. David Solorzano [id number]      To Whom It May Concern:    Bc Bernabe is currently under the care of [facility] [address]. Please excuse patient from work from 12/4/21 to 12/10/21  He will return to work on: 12/13/21    If there are questions or concerns please have the patient contact our office.         Sincerely,          Lexus Bass MD

## 2021-12-06 NOTE — TELEPHONE ENCOUNTER
Patient's wife called stated   is having severe back pain been taking aleve not helping and would liked to know if provider could call in something stronger. Want it to be called into [facility].     Best call back #[phone]

## 2021-12-06 NOTE — PROGRESS NOTES
Areli Vazquez  64 y.o. male  1965  [address] [facility]  [id number]     [address] [place] [address]       Encounter Date: 12/6/2021           Established Patient Visit Note: Ibis Barillas MD    Reason for Appointment:  Chief Complaint   Patient presents with    Back Pain       History of Present Illness:  History provided by patient    Areli Vazquez is a 64 y.o. male who presents today for:    Acute Back Pain  Duration: pain started early Saturday morning. Inciting Factors: on Friday he was working under the deck to cut some weeks and he twisted his spine to get under. Location: midline to right lower back; pain does not radiate down leg  He reports that it is a little better today. He has been taking aleve and hot compress, which has not helped very much. He works as a , and he does not think that he can wear a vest or or police belt  He reports having something line this before and a muscle relaxer helped    Review of Systems  Review of Systems   Constitutional: Negative for chills and fever. Respiratory: Negative for cough, shortness of breath and wheezing. Cardiovascular: Negative for chest pain and palpitations. Allergies: Patient has no known allergies. Medications: (Updated to reflect final medication list after visit)    Current Outpatient Medications:     diclofenac EC (VOLTAREN) 75 mg EC tablet, Take 1 Tablet by mouth two (2) times daily as needed for Pain., Disp: 20 Tablet, Rfl: 0    cyclobenzaprine (FLEXERIL) 5 mg tablet, Take 1 Tablet by mouth three (3) times daily as needed for Muscle Spasm(s) (and pain). DO NOT DRIVE OR OPERATE MACHINERY WHILE USING THIS MEDICATION, Disp: 21 Tablet, Rfl: 0    simvastatin (ZOCOR) 20 mg tablet, Take 1 Tablet by mouth daily. , Disp: 90 Tablet, Rfl: 1    multivitamin, stress formula (STRESS TAB) tablet, Take 1 Tab by mouth daily. , Disp: , Rfl:     omega-3 fatty acids-vitamin e (FISH OIL) 1,000 mg Cap, Take 4 Caps by mouth.  , Disp: , Rfl:     cholecalciferol, vitamin D3, (VITAMIN D3) 2,000 unit Tab, Take 4,000 Units by mouth., Disp: , Rfl:     History  Patient Care Team:  Naseem Gunter MD as PCP - General (Family Medicine)  Naseem Gunter MD as PCP - [facility]    Past Medical History: he has a past medical history of Dyslipidemia, goal LDL below 130 and Obesity (BMI 30.0-34.9) (6/17/2018). He also has no past medical history of Abuse, Anemia NEC, Arrhythmia, Arthritis, Asthma, Autoimmune disease ([address] CAD (coronary artery disease), Calculus of kidney, Cancer ([address] Chronic kidney disease, Chronic pain, Congestive heart failure, unspecified, Contact dermatitis and other eczema, due to unspecified cause, COPD, Depression, Diabetes ([address] GERD (gastroesophageal reflux disease), Headache(784.0), Hypertension, Liver disease, Psychotic disorder ([address] PUD (peptic ulcer disease), Seizures ([address] Stroke ([address] Thromboembolus ([address] Thyroid disease, or Trauma. Past Surgical History: he has a past surgical history that includes hx other surgical (1998). Family Medical History: family history includes Cancer (age of onset: 61) in his mother; Other (age of onset: 80) in his father; Pancreatic Cancer in his maternal uncle. Social History: he reports that he has never smoked. He has never used smokeless tobacco. He reports current alcohol use. He reports that he does not use drugs. Objective:     Physical Exam    Constitutional:       General: Not in acute distress. Appearance: Normal appearance. Not ill-appearing,  Not toxic-appearing. HENT:      Head: Normocephalic. Right Ear: External ear normal.      Left Ear: External ear normal.      Nose: Nose normal.   Eyes:      General: No scleral icterus. Right eye: No discharge. Left eye: No discharge. Extraocular Movements: Extraocular movements intact.       Conjunctiva/sclera: Conjunctivae normal.   Neck: Musculoskeletal: Normal range of motion. Pulmonary:      Effort: Pulmonary effort is normal. No respiratory distress. Neurological:      Mental Status: Mental status is at baseline. Psychiatric:         Mood and Affect: Mood normal.         Behavior: Behavior normal.         Thought Content: Thought content normal.         Judgment: Judgment normal.       Assessment & Plan:      ICD-10-CM ICD-9-CM    1. Acute right-sided low back pain without sciatica  M54.50 724.2 diclofenac EC (VOLTAREN) 75 mg EC tablet      cyclobenzaprine (FLEXERIL) 5 mg tablet     Acute, uncontrolled. Treat as above. If not improved by 12/9/21, will obtain xray to evaluate further. I was in the office while conducting this encounter. Consent:  He and/or his healthcare decision maker is aware that this patient-initiated Telehealth encounter is a billable service, with coverage as determined by his insurance carrier. He is aware that he may receive a bill and has provided verbal consent to proceed: Yes    This virtual visit was conducted via [address] Pursuant to the emergency declaration under the [facility], [facility] waiver authority and the [employer] Act, this Virtual  Visit was conducted to reduce the patient's risk of exposure to COVID-19 and provide continuity of care for an established patient. Services were provided through a video synchronous discussion virtually to substitute for in-person clinic visit. Due to this being a TeleHealth evaluation, many elements of the physical examination are unable to be assessed. Total Time: minutes: 21-30 minutes. I have discussed the diagnosis with the patient and the intended plan as seen in the above orders. The patient has received an after-visit summary along with patient information handout.   I have discussed medication side effects and warnings with the patient as well.    Disposition  Follow-up and Dispositions    · Return if symptoms worsen or fail to improve.            Cata Barreto MD

## 2021-12-07 ENCOUNTER — TELEPHONE (OUTPATIENT)
Dept: FAMILY MEDICINE CLINIC | Age: 56
End: 2021-12-07

## 2021-12-07 NOTE — TELEPHONE ENCOUNTER
Pt wanted me to walk them through how to find a letter on [employer]. Advised of the L[address] number. Pt requested his return to work letter be put at the  for pick-up. Placed in front bin.

## 2021-12-08 NOTE — TELEPHONE ENCOUNTER
Patient had a VV with patient 2 days ago.       ----- Message from Juanita Rider sent at 12/6/2021 11:22 AM EST -----  Subject: Refill Request    QUESTIONS  Name of Medication? Other - pain medication for back, given 5-6 years ago   Patient-reported dosage and instructions? unknown   How many days do you have left? 0  Preferred Pharmacy? [id number] phone number (if available)? [phone]  Additional Information for Provider? pain, twisted back, needs   prescription stronger than aleeve, second day of work missed,   [phone], scheduled 12/10 2:30   ---------------------------------------------------------------------------  --------------  [address]  What is the best way for the office to contact you? OK to leave message on   voicemail  Preferred Call Back Phone Number?  [phone]

## 2021-12-09 NOTE — TELEPHONE ENCOUNTER
Patient's wife called stated   is having severe back pain been taking aleve not helping and would liked to know if provider could call in something stronger. Want it to be called into [facility].

## 2021-12-10 NOTE — TELEPHONE ENCOUNTER
Called patient. He reports that he is feeling better.      Veronique De Paz MD Complex Repair And A-T Advancement Flap Text: The defect edges were debeveled with a #15 scalpel blade.  The primary defect was closed partially with a complex linear closure.  Given the location of the remaining defect, shape of the defect and the proximity to free margins an A-T advancement flap was deemed most appropriate for complete closure of the defect.  Using a sterile surgical marker, an appropriate advancement flap was drawn incorporating the defect and placing the expected incisions within the relaxed skin tension lines where possible.    The area thus outlined was incised deep to adipose tissue with a #15 scalpel blade.  The skin margins were undermined to an appropriate distance in all directions utilizing iris scissors.

## 2022-02-21 ENCOUNTER — TELEPHONE (OUTPATIENT)
Dept: FAMILY MEDICINE CLINIC | Age: 57
End: 2022-02-21

## 2022-03-18 PROBLEM — E66.01 SEVERE OBESITY (HCC): Status: ACTIVE | Noted: 2018-12-17

## 2022-03-20 PROBLEM — R35.1 BPH ASSOCIATED WITH NOCTURIA: Status: ACTIVE | Noted: 2018-12-17

## 2022-03-20 PROBLEM — N40.1 BPH ASSOCIATED WITH NOCTURIA: Status: ACTIVE | Noted: 2018-12-17

## 2022-03-28 DIAGNOSIS — E78.5 DYSLIPIDEMIA, GOAL LDL BELOW 130: ICD-10-CM

## 2022-03-28 RX ORDER — SIMVASTATIN 20 MG/1
20 TABLET, FILM COATED ORAL DAILY
Qty: 90 TABLET | Refills: 1 | Status: SHIPPED | OUTPATIENT
Start: 2022-03-28 | End: 2022-08-08

## 2022-03-28 NOTE — TELEPHONE ENCOUNTER
Last Visit: 9/29/21 MD Samina Lee, lipid 9/2021  Next Appointment: 7/8/22 MD Samina Lee  Previous Refill Encounter(s): 10/6/21 90 + 1    Requested Prescriptions     Pending Prescriptions Disp Refills    simvastatin (ZOCOR) 20 mg tablet 90 Tablet 1     Sig: Take 1 Tablet by mouth daily.

## 2022-07-06 ENCOUNTER — VIRTUAL VISIT (OUTPATIENT)
Dept: FAMILY MEDICINE CLINIC | Age: 57
End: 2022-07-06
Payer: COMMERCIAL

## 2022-07-06 DIAGNOSIS — U07.1 COVID-19: Primary | ICD-10-CM

## 2022-07-06 PROCEDURE — 99214 OFFICE O/P EST MOD 30 MIN: CPT | Performed by: STUDENT IN AN ORGANIZED HEALTH CARE EDUCATION/TRAINING PROGRAM

## 2022-07-06 RX ORDER — FLUTICASONE PROPIONATE 50 MCG
SPRAY, SUSPENSION (ML) NASAL
Qty: 3 EACH | Refills: 0 | Status: SHIPPED | OUTPATIENT
Start: 2022-07-06 | End: 2022-08-08

## 2022-07-06 NOTE — LETTER
NOTIFICATION RETURN TO WORK     7/6/2022 5:39 PM    Mr. [place] [id number]      To Whom It May Concern:    Blaise Abad is currently under the care of [facility]. He will may return to work on July 10th, continuing to wear a mask on on light duty through July 15th when he should be able to return to work in full capacity. Depending on the course of his illness these dates may change. If there are questions or concerns please have the patient contact our office.         Sincerely,      Jesi Mahajan MD

## 2022-07-06 NOTE — PROGRESS NOTES
Vijaya Lindsey  64 y.o. male  1965  [place] [address]  [id number]   [facility]  Telemedicine Progress Note  uJs Parr MD       Encounter Date and Time: July 6, 2022 at 10:25 AM    Consent:  He and/or the health care decision maker is aware that that he may receive a bill for this telephone service, depending on his insurance coverage, and has provided verbal consent to proceed: Yes    Chief Complaint   Patient presents with    Positive For Covid-19     History of Present Illness   Vijaya Lindsey is a 64 y.o. male was evaluated by synchronous (real-time) audio-video technology from home, through the [employer] Patient Portal.    Covid 19  Started with symptoms 2 days ago with nasal congestion and runny nose and sore throat. Tested positive one day ago. Has been taking ASA and nyquil for sleep. T to 100.6 last night but not since 10p last night. No SOB, wheezing. Review of Systems   Review of Systems   Constitutional: Positive for malaise/fatigue. Negative for chills and fever. HENT: Positive for congestion and sore throat. Respiratory: Positive for cough. Negative for shortness of breath and wheezing. Cardiovascular: Negative for chest pain and palpitations. Musculoskeletal: Negative for myalgias. Neurological: Positive for headaches. Negative for dizziness. Vitals/Objective:     General: alert, cooperative, no distress   Mental  status: mental status: alert, oriented to person, place, and time, normal mood, behavior, speech, dress, motor activity, and thought processes   Resp: resp: normal effort, no respiratory distress, no accessory muscle use and no cough during duration of 25min video encounter    Neuro: neuro: no gross deficits   Skin: skin: no discoloration or lesions of concern on visible areas   Due to this being a TeleHealth evaluation, many elements of the physical examination are unable to be assessed.       Assessment and Plan:   Time-based coding, delete if not needed: I spent at least 25 minutes with this established patient, and >50% of the time was spent counseling and/or coordinating care regarding COVID 19, treatment options, quarantine    1. COVID-19  - nirmatrelvir-ritonavir (PAXLOVID) 150 mg x 2- 100 mg tablet; Take 100mg (one tablet) of ritonavir and 300mg (2 tablets) of nirmatrelvir twice daily for five days. Indications: COVID-19 (emergency use authorization)  Dispense: 1 Box; Refill: 0  - fluticasone propionate (FLONASE) 50 mcg/actuation nasal spray; Use one spray per nostril twice daily  Dispense: 3 Each; Refill: 0  - tylenol an ibuprofen for fever, body aches   - mucinex, flonase for congestion  - warm tea and cough drops for sore throat  - ED precautions, expected course of illness and quarantine guidelines reviewed         We discussed the expected course, resolution and complications of the diagnosis(es) in detail. Medication risks, benefits, costs, interactions, and alternatives were discussed as indicated. I advised him to contact the office if his condition worsens, changes or fails to improve as anticipated. He expressed understanding with the diagnosis(es) and plan. Patient understands that this encounter was a temporary measure, and the importance of further follow up and examination was emphasized. Patient verbalized understanding. Patient informed to follow up: Follow-up and Dispositions  ·   Return in about 1 month (around 8/6/2022) for routine care with Dr Nicole Delgado.          Electronically Signed: Jus Parr MD    Providers location when delivering service: clinic     Pursuant to the emergency declaration under the [facility] waiver authority and the [employer] Act, this Virtual  Visit was conducted, with patient's consent, to reduce the patient's risk of exposure to COVID-19 and provide continuity of care for an established patient. Services were provided through a video synchronous discussion virtually to substitute for in-person clinic visit. History   Patients past medical, surgical and family histories were reviewed and updated. Past Medical History:   Diagnosis Date    Dyslipidemia, goal LDL below 130     Obesity (BMI 30.0-34.9) 6/17/2018     Past Surgical History:   Procedure Laterality Date    HX OTHER SURGICAL  1998    anal fissure     Family History   Problem Relation Age of Onset    Cancer Mother 61        metastatic cancer to abd cavity, unclear primary    Other Father 80    Pancreatic Cancer Maternal Uncle      Social History     Socioeconomic History    Marital status:      Spouse name: Not on file    Number of children: Not on file    Years of education: Not on file    Highest education level: Not on file   Occupational History    Not on file   Tobacco Use    Smoking status: Never Smoker    Smokeless tobacco: Never Used   Substance and Sexual Activity    Alcohol use: Yes    Drug use: No    Sexual activity: Yes   Other Topics Concern     Service Yes    Blood Transfusions No    Caffeine Concern No    Occupational Exposure No    Hobby Hazards No    Sleep Concern No    Stress Concern No    Weight Concern No    Special Diet No    Back Care No    Exercise Yes    Bike Helmet Not Asked     Comment: na    Seat Belt Yes    Self-Exams No   Social History Narrative    Not on file     Social Determinants of Health     Financial Resource Strain:     Difficulty of Paying Living Expenses: Not on file   Food Insecurity:     Worried About Running Out of Food in the Last Year: Not on file    Adam of Food in the Last Year: Not on file   Transportation Needs:     Lack of Transportation (Medical): Not on file    Lack of Transportation (Non-Medical):  Not on file   Physical Activity:     Days of Exercise per Week: Not on file    Minutes of Exercise per Session: Not on file   Stress:     Feeling of Stress : Not on file   Social Connections:     Frequency of Communication with Friends and Family: Not on file    Frequency of Social Gatherings with Friends and Family: Not on file    Attends Yazidism Services: Not on file    Active Member of [address] [place] [address] or Organizations: Not on file    Attends Club or Organization Meetings: Not on file    Marital Status: Not on file   Intimate Partner Violence:     Fear of Current or Ex-Partner: Not on file    Emotionally Abused: Not on file    Physically Abused: Not on file    Sexually Abused: Not on file   Housing Stability:     Unable to Pay for Housing in the Last Year: Not on file    Number of [place] in the Last Year: Not on file    Unstable Housing in the Last Year: Not on file     Patient Active Problem List   Diagnosis Code    Vitamin D deficiency E55.9    History of colonoscopy-12/17/2010 Z98.890    Allergic rhinitis J30.9    Dyslipidemia, goal LDL below 130 E78.5    Severe obesity ([facility] E66.01    BPH associated with nocturia N40.1, R35.1    COVID-19 U07.1          Current Medications/Allergies   Medications and Allergies reviewed:    Current Outpatient Medications   Medication Sig Dispense Refill    nirmatrelvir-ritonavir (PAXLOVID) 150 mg x 2- 100 mg tablet Take 100mg (one tablet) of ritonavir and 300mg (2 tablets) of nirmatrelvir twice daily for five days. Indications: COVID-19 (emergency use authorization) 1 Box 0    fluticasone propionate (FLONASE) 50 mcg/actuation nasal spray Use one spray per nostril twice daily 3 Each 0    simvastatin (ZOCOR) 20 mg tablet Take 1 Tablet by mouth daily. 90 Tablet 1    multivitamin, stress formula (STRESS TAB) tablet Take 1 Tab by mouth daily.  omega-3 fatty acids-vitamin e (FISH OIL) 1,000 mg Cap Take 4 Caps by mouth.  cholecalciferol, vitamin D3, (VITAMIN D3) 2,000 unit Tab Take 4,000 Units by mouth.       diclofenac EC (VOLTAREN) 75 mg EC tablet Take 1 Tablet by mouth two (2) times daily as needed for Pain. (Patient not taking: Reported on 7/6/2022) 20 Tablet 0    cyclobenzaprine (FLEXERIL) 5 mg tablet Take 1 Tablet by mouth three (3) times daily as needed for Muscle Spasm(s) (and pain).  DO NOT DRIVE OR OPERATE MACHINERY WHILE USING THIS MEDICATION (Patient not taking: Reported on 7/6/2022) 21 Tablet 0     No Known Allergies

## 2022-08-08 ENCOUNTER — OFFICE VISIT (OUTPATIENT)
Dept: FAMILY MEDICINE CLINIC | Age: 57
End: 2022-08-08
Payer: COMMERCIAL

## 2022-08-08 VITALS
DIASTOLIC BLOOD PRESSURE: 88 MMHG | BODY MASS INDEX: 34.27 KG/M2 | TEMPERATURE: 97.8 F | HEART RATE: 96 BPM | WEIGHT: 231.4 LBS | OXYGEN SATURATION: 97 % | HEIGHT: 69 IN | RESPIRATION RATE: 14 BRPM | SYSTOLIC BLOOD PRESSURE: 124 MMHG

## 2022-08-08 DIAGNOSIS — R22.2 NODULE OF BUTTOCK: ICD-10-CM

## 2022-08-08 DIAGNOSIS — Z13.0 SCREENING FOR DEFICIENCY ANEMIA: ICD-10-CM

## 2022-08-08 DIAGNOSIS — Z00.00 ANNUAL PHYSICAL EXAM: Primary | ICD-10-CM

## 2022-08-08 DIAGNOSIS — Z91.89 H/O MODERATE SUN EXPOSURE: ICD-10-CM

## 2022-08-08 DIAGNOSIS — Z13.89 SCREENING FOR BLOOD OR PROTEIN IN URINE: ICD-10-CM

## 2022-08-08 DIAGNOSIS — E78.5 DYSLIPIDEMIA, GOAL LDL BELOW 130: ICD-10-CM

## 2022-08-08 DIAGNOSIS — R35.1 BPH ASSOCIATED WITH NOCTURIA: ICD-10-CM

## 2022-08-08 DIAGNOSIS — E55.9 VITAMIN D DEFICIENCY: ICD-10-CM

## 2022-08-08 DIAGNOSIS — N40.1 BPH ASSOCIATED WITH NOCTURIA: ICD-10-CM

## 2022-08-08 DIAGNOSIS — R73.01 ELEVATED FASTING GLUCOSE: ICD-10-CM

## 2022-08-08 PROCEDURE — 99396 PREV VISIT EST AGE 40-64: CPT | Performed by: FAMILY MEDICINE

## 2022-08-08 RX ORDER — SIMVASTATIN 20 MG/1
20 TABLET, FILM COATED ORAL DAILY
Qty: 90 TABLET | Refills: 3 | Status: SHIPPED | OUTPATIENT
Start: 2022-08-08

## 2022-08-08 NOTE — PROGRESS NOTES
Chief Complaint   Patient presents with    Complete Physical     1. Have you been to the ER, urgent care clinic since your last visit? Hospitalized since your last visit? No    2. Have you seen or consulted any other health care providers outside of the [address] [place] [address] since your last visit? Include any pap smears or colon screening.  No

## 2022-08-08 NOTE — PROGRESS NOTES
Lexie Tijerina  62 y.o. male  1965  [address] [facility]  [id number]     [address] [facility]       Encounter Date: 8/8/2022           Established Patient Visit Note: Yudelka Almendarez MD    Reason for Appointment:  Chief Complaint   Patient presents with    Complete Physical         History of Present Illness:  History provided by patient    Lexie Tijerina is a 62 y.o. male who presents to clinic today for:     Annual Physical    Diet: he reportsd that he has cut down on soda to one per week. He has also cut down on the bread and calories. Hx of COVID: he was diagnosed with COVID in July, 2022. Elevated Fasting Glucose: last A1c was 5.5 on 9/29/21  Obesity: patient has lost 3 lbs since last visit  Dyslipidemia: continues Zocor with good adherance  BPH with nocturia: he reports as stable; he would like to hold off on medications for now  Vit D Deficiency: last vit D level was 63.8; taking Vit D 4000 international units  daily  H/O sun Exposure/Skin Lesion/Nodule Of Buttock: patient referred to drematology on 3/12/21, but he has not yet scheduled an appointment d/t being busy.   CCS: he had negative cologuard on 10/21/21  Shingles:  He also reports receiving the shingles vaccine and [facility]  COVID Vaccine: he reports that he has had COVID vaccines and boosters. Review of Systems  All other ROS were reviewed and are negative except as discussed in HPI         Allergies: Patient has no known allergies. Medications:     Current Outpatient Medications:     simvastatin (ZOCOR) 20 mg tablet, Take 1 Tablet by mouth in the morning., Disp: 90 Tablet, Rfl: 3    multivitamin, stress formula (STRESS TAB) tablet, Take 1 Tab by mouth daily. , Disp: , Rfl:     omega-3 fatty acids-vitamin e 1,000 mg cap, Take 4 Caps by mouth.  , Disp: , Rfl:     cholecalciferol, vitamin D3, 50 mcg (2,000 unit) tab, Take 4,000 Units by mouth., Disp: , Rfl:     History  Patient Care Team:  Burt Tariq MD as PCP - General (Family Medicine)  Burt Tariq MD as PCP - [facility]    Past Medical History: he has a past medical history of Dyslipidemia, goal LDL below 130 and Obesity (BMI 30.0-34.9) (6/17/2018). He has no past medical history of Abuse, Anemia NEC, Arrhythmia, Arthritis, Asthma, Autoimmune disease ([address] CAD (coronary artery disease), Calculus of kidney, Cancer ([address] Chronic kidney disease, Chronic pain, Congestive heart failure, unspecified, Contact dermatitis and other eczema, due to unspecified cause, COPD, Depression, Diabetes ([address] GERD (gastroesophageal reflux disease), Headache(784.0), Hypertension, Liver disease, Psychotic disorder ([address] PUD (peptic ulcer disease), Seizures ([address] Stroke ([address] Thromboembolus ([address] Thyroid disease, or Trauma. Past Surgical History: he has a past surgical history that includes hx other surgical (1998). Family Medical History: family history includes Cancer (age of onset: 61) in his mother; Other (age of onset: 80) in his father; Pancreatic Cancer in his maternal uncle. Social History: he reports that he has never smoked. He has never used smokeless tobacco. He reports current alcohol use. He reports that he does not use drugs. Objective:   Visit Vitals  /88 (BP 1 Location: Right arm, BP Patient Position: Sitting, BP Cuff Size: Adult long)   Pulse 96   Temp 97.8 °F (36.6 °C) (Temporal)   Resp 14   Ht 5' 8.5\" (1.74 m)   Wt 231 lb 6.4 oz (105 kg)   SpO2 97%   BMI 34.67 kg/m²     Wt Readings from Last 3 Encounters:   08/08/22 231 lb 6.4 oz (105 kg)   09/29/21 234 lb (106.1 kg)   03/12/21 238 lb 6.4 oz (108.1 kg)       Physical Exam  HENT:      Head: Normocephalic and atraumatic. Right Ear: External ear normal.      Left Ear: External ear normal.      Nose: Nose normal.      Mouth/Throat:      Mouth: Mucous membranes are moist.      Pharynx: No oropharyngeal exudate.    Eyes:      General: Lids are normal. No scleral icterus. Right eye: No discharge. Left eye: No discharge. Extraocular Movements: Extraocular movements intact. Conjunctiva/sclera: Conjunctivae normal.      Pupils: Pupils are equal, round, and reactive to light. Neck:      Thyroid: No thyromegaly. Vascular: No JVD. Trachea: No tracheal deviation. Cardiovascular:      Rate and Rhythm: Normal rate and regular rhythm. Pulses:           Radial pulses are 2+ on the right side and 2+ on the left side. Heart sounds: Normal heart sounds. No murmur heard. No friction rub. No gallop. Pulmonary:      Effort: Pulmonary effort is normal. No respiratory distress. Breath sounds: Normal breath sounds. No stridor. No wheezing. Abdominal:      General: Bowel sounds are normal. There is no distension. Palpations: Abdomen is soft. There is no mass. Tenderness: There is no abdominal tenderness. There is no guarding or rebound. Musculoskeletal:         General: No tenderness or deformity. Normal range of motion. Cervical back: Normal range of motion and neck supple. Right lower leg: No edema. Left lower leg: No edema. Lymphadenopathy:      Cervical: No cervical adenopathy. Skin:     General: Skin is warm and dry. Comments: 1cm nodule on left gluteal fold   Neurological:      Mental Status: He is alert. Cranial Nerves: No cranial nerve deficit. Coordination: Coordination normal.      Gait: Gait is intact. Deep Tendon Reflexes: Reflexes normal.   Psychiatric:         Mood and Affect: Mood normal.         Behavior: Behavior normal.         Thought Content: Thought content normal.           Assessment & Plan:      ICD-10-CM ICD-9-CM    1. Annual physical exam  Z00.00 V70.0       2. H/O moderate sun exposure  Z91.89 V15.89 REFERRAL TO DERMATOLOGY      3. Nodule of buttock  R22.2 782.2 REFERRAL TO DERMATOLOGY      4.  Dyslipidemia, goal LDL below 130 E78.5 272.4 LIPID PANEL      METABOLIC PANEL, COMPREHENSIVE      TSH 3RD GENERATION      LIPID PANEL      METABOLIC PANEL, COMPREHENSIVE      TSH 3RD GENERATION      simvastatin (ZOCOR) 20 mg tablet      5. BPH associated with nocturia  N40.1 600.01 PSA W/ REFLX FREE PSA    R35.1 788.43 PSA W/ REFLX FREE PSA      6. Vitamin D deficiency  E55.9 268.9 VITAMIN D, 25 HYDROXY      VITAMIN D, 25 HYDROXY      7. Elevated fasting glucose  R73.01 790.21 HEMOGLOBIN A1C WITH EAG      HEMOGLOBIN A1C WITH EAG      8. Screening for deficiency anemia  Z13.0 V78.1 CBC W/O DIFF      CBC W/O DIFF      9. Screening for blood or protein in urine  Z13.89 V82.9 URINALYSIS W/ RFLX MICROSCOPIC      URINALYSIS W/ RFLX MICROSCOPIC        Annual Physical Exam: Reviewed and addressed patient's medical history and concerns as discussed in note. Reviewed recommended screenings and immunizations. Discussed recommendations for diet, exercise, and lifestyle. Will provide another referral to dermatology. Advised to ensure that he schedules this. I have discussed the diagnosis with the patient and the intended plan as seen in the above orders. The patient has received an after-visit summary along with patient information handout. I have discussed medication side effects and warnings with the patient as well.     Disposition  Follow-up and Dispositions    Return in about 1 year (around 8/8/2023) for annual physical.           Sharda Hernandez MD

## 2022-08-09 LAB
25(OH)D3+25(OH)D2 SERPL-MCNC: 62.7 NG/ML (ref 30–100)
ALBUMIN SERPL-MCNC: 4.5 G/DL (ref 3.8–4.9)
ALBUMIN/GLOB SERPL: 1.8 {RATIO} (ref 1.2–2.2)
ALP SERPL-CCNC: 75 IU/L (ref 44–121)
ALT SERPL-CCNC: 31 IU/L (ref 0–44)
APPEARANCE UR: CLEAR
AST SERPL-CCNC: 17 IU/L (ref 0–40)
BILIRUB SERPL-MCNC: 0.4 MG/DL (ref 0–1.2)
BILIRUB UR QL STRIP: NEGATIVE
BUN SERPL-MCNC: 21 MG/DL (ref 6–24)
BUN/CREAT SERPL: 21 (ref 9–20)
CALCIUM SERPL-MCNC: 9 MG/DL (ref 8.7–10.2)
CHLORIDE SERPL-SCNC: 105 MMOL/L (ref 96–106)
CHOLEST SERPL-MCNC: 220 MG/DL (ref 100–199)
CO2 SERPL-SCNC: 20 MMOL/L (ref 20–29)
COLOR UR: YELLOW
CREAT SERPL-MCNC: 0.99 MG/DL (ref 0.76–1.27)
EGFR: 89 ML/MIN/1.73
ERYTHROCYTE [DISTWIDTH] IN BLOOD BY AUTOMATED COUNT: 13 % (ref 11.6–15.4)
EST. AVERAGE GLUCOSE BLD GHB EST-MCNC: 114 MG/DL
GLOBULIN SER CALC-MCNC: 2.5 G/DL (ref 1.5–4.5)
GLUCOSE SERPL-MCNC: 105 MG/DL (ref 65–99)
GLUCOSE UR QL STRIP: NEGATIVE
HBA1C MFR BLD: 5.6 % (ref 4.8–5.6)
HCT VFR BLD AUTO: 46.4 % (ref 37.5–51)
HDLC SERPL-MCNC: 49 MG/DL
HGB BLD-MCNC: 15.2 G/DL (ref 13–17.7)
HGB UR QL STRIP: NEGATIVE
IMP & REVIEW OF LAB RESULTS: NORMAL
KETONES UR QL STRIP: ABNORMAL
LDLC SERPL CALC-MCNC: 125 MG/DL (ref 0–99)
LEUKOCYTE ESTERASE UR QL STRIP: NEGATIVE
MCH RBC QN AUTO: 29.2 PG (ref 26.6–33)
MCHC RBC AUTO-ENTMCNC: 32.8 G/DL (ref 31.5–35.7)
MCV RBC AUTO: 89 FL (ref 79–97)
MICRO URNS: ABNORMAL
NITRITE UR QL STRIP: NEGATIVE
PH UR STRIP: 5 [PH] (ref 5–7.5)
PLATELET # BLD AUTO: 262 X10E3/UL (ref 150–450)
POTASSIUM SERPL-SCNC: 4.4 MMOL/L (ref 3.5–5.2)
PROT SERPL-MCNC: 7 G/DL (ref 6–8.5)
PROT UR QL STRIP: NEGATIVE
PSA SERPL-MCNC: 0.8 NG/ML (ref 0–4)
RBC # BLD AUTO: 5.21 X10E6/UL (ref 4.14–5.8)
REFLEX CRITERIA: NORMAL
SODIUM SERPL-SCNC: 141 MMOL/L (ref 134–144)
SP GR UR STRIP: 1.02 (ref 1–1.03)
TRIGL SERPL-MCNC: 264 MG/DL (ref 0–149)
TSH SERPL DL<=0.005 MIU/L-ACNC: 3.02 UIU/ML (ref 0.45–4.5)
UROBILINOGEN UR STRIP-MCNC: 0.2 MG/DL (ref 0.2–1)
VLDLC SERPL CALC-MCNC: 46 MG/DL (ref 5–40)
WBC # BLD AUTO: 6.6 X10E3/UL (ref 3.4–10.8)

## 2022-08-25 ENCOUNTER — TELEPHONE (OUTPATIENT)
Dept: FAMILY MEDICINE CLINIC | Age: 57
End: 2022-08-25

## 2022-08-25 NOTE — TELEPHONE ENCOUNTER
Please let patient know that it was coded based on the associated diagnoses for which the labs were ordered. If they would like it coded for annual physical, which insurance companies often do not pay for, they can request that [facility] fax us with the request and I will add the annual physical code to the [address] [place] [address] requisition.      Veronique De Paz MD

## 2022-08-25 NOTE — TELEPHONE ENCOUNTER
[facility] 08[facility]08/2022 had physical when submitted to lab wilfredo did not go through as a physical. Was not code right.     Requesting a call back    Best call back #[phone]

## 2022-08-26 NOTE — TELEPHONE ENCOUNTER
AJ,     Can you please call [facility] to obtain the form that allows us to add additional codes to the patient's lab requisition?     Drew Carr MD

## 2022-08-26 NOTE — TELEPHONE ENCOUNTER
Called, spoke to lab wilfredo and DX Code changed Z00.00 per MD updated Requisition. Called, spoke to pt. And wife  Two pt identifiers confirmed. Writer informed them that code was updated with [employer] and resubmitted and will be completed in 30 days. They verbalized understanding of information discussed w/ no further questions at this time.

## 2022-08-26 NOTE — TELEPHONE ENCOUNTER
Updated requisition to include Z00.00 diagnosis code. Could you please fax this to [facility] and confirm that this is what they need?     Rick Salguero MD

## 2022-08-26 NOTE — TELEPHONE ENCOUNTER
Patient's wife call back about DOS 08/08/2022 stated she talk to [employer] and the office would have to call [phone] invoice number is [id number] and add the annual physical code.     Best call back #[phone]

## 2022-08-26 NOTE — TELEPHONE ENCOUNTER
Called, spoke to pt. Two pt identifiers confirmed. Spoke to Karan Woo and the patient. So the code needs to be changed to Wellness from Illness.  We can call that into [facility] or you can put it in letter form and we can fax it to [facility].

## 2023-05-19 RX ORDER — ALBUTEROL SULFATE 90 UG/1
AEROSOL, METERED RESPIRATORY (INHALATION)
COMMUNITY
Start: 2023-04-02

## 2023-05-19 RX ORDER — SIMVASTATIN 20 MG
20 TABLET ORAL DAILY
COMMUNITY
Start: 2022-08-08

## 2023-07-13 ENCOUNTER — OFFICE VISIT (OUTPATIENT)
Age: 58
End: 2023-07-13

## 2023-07-13 VITALS
HEART RATE: 92 BPM | WEIGHT: 236.8 LBS | DIASTOLIC BLOOD PRESSURE: 96 MMHG | HEIGHT: 69 IN | OXYGEN SATURATION: 98 % | SYSTOLIC BLOOD PRESSURE: 136 MMHG | TEMPERATURE: 97 F | BODY MASS INDEX: 35.07 KG/M2 | RESPIRATION RATE: 18 BRPM

## 2023-07-13 DIAGNOSIS — Z23 ENCOUNTER FOR IMMUNIZATION: ICD-10-CM

## 2023-07-13 DIAGNOSIS — E66.01 CLASS 2 SEVERE OBESITY DUE TO EXCESS CALORIES WITH SERIOUS COMORBIDITY AND BODY MASS INDEX (BMI) OF 35.0 TO 35.9 IN ADULT (HCC): ICD-10-CM

## 2023-07-13 DIAGNOSIS — E78.5 DYSLIPIDEMIA, GOAL LDL BELOW 130: ICD-10-CM

## 2023-07-13 DIAGNOSIS — E55.9 VITAMIN D DEFICIENCY: ICD-10-CM

## 2023-07-13 DIAGNOSIS — R03.0 ELEVATED BLOOD-PRESSURE READING WITHOUT DIAGNOSIS OF HYPERTENSION: ICD-10-CM

## 2023-07-13 DIAGNOSIS — Z12.5 ENCOUNTER FOR SCREENING FOR MALIGNANT NEOPLASM OF PROSTATE: ICD-10-CM

## 2023-07-13 DIAGNOSIS — Z00.00 ENCOUNTER FOR WELLNESS EXAMINATION IN ADULT: Primary | ICD-10-CM

## 2023-07-13 DIAGNOSIS — Z11.4 SCREENING FOR HIV (HUMAN IMMUNODEFICIENCY VIRUS): ICD-10-CM

## 2023-07-13 PROBLEM — U07.1 COVID-19: Status: RESOLVED | Noted: 2022-07-06 | Resolved: 2023-07-13

## 2023-07-13 RX ORDER — CHLORAL HYDRATE 500 MG
4 CAPSULE ORAL
COMMUNITY

## 2023-07-13 SDOH — ECONOMIC STABILITY: FOOD INSECURITY: WITHIN THE PAST 12 MONTHS, YOU WORRIED THAT YOUR FOOD WOULD RUN OUT BEFORE YOU GOT MONEY TO BUY MORE.: NEVER TRUE

## 2023-07-13 SDOH — ECONOMIC STABILITY: INCOME INSECURITY: HOW HARD IS IT FOR YOU TO PAY FOR THE VERY BASICS LIKE FOOD, HOUSING, MEDICAL CARE, AND HEATING?: NOT HARD AT ALL

## 2023-07-13 SDOH — ECONOMIC STABILITY: FOOD INSECURITY: WITHIN THE PAST 12 MONTHS, THE FOOD YOU BOUGHT JUST DIDN'T LAST AND YOU DIDN'T HAVE MONEY TO GET MORE.: NEVER TRUE

## 2023-07-13 SDOH — ECONOMIC STABILITY: HOUSING INSECURITY
IN THE LAST 12 MONTHS, WAS THERE A TIME WHEN YOU DID NOT HAVE A STEADY PLACE TO SLEEP OR SLEPT IN A SHELTER (INCLUDING NOW)?: NO

## 2023-07-13 ASSESSMENT — PATIENT HEALTH QUESTIONNAIRE - PHQ9
4. FEELING TIRED OR HAVING LITTLE ENERGY: 0
5. POOR APPETITE OR OVEREATING: 0
10. IF YOU CHECKED OFF ANY PROBLEMS, HOW DIFFICULT HAVE THESE PROBLEMS MADE IT FOR YOU TO DO YOUR WORK, TAKE CARE OF THINGS AT HOME, OR GET ALONG WITH OTHER PEOPLE: 0
SUM OF ALL RESPONSES TO PHQ QUESTIONS 1-9: 0
SUM OF ALL RESPONSES TO PHQ QUESTIONS 1-9: 0
6. FEELING BAD ABOUT YOURSELF - OR THAT YOU ARE A FAILURE OR HAVE LET YOURSELF OR YOUR FAMILY DOWN: 0
SUM OF ALL RESPONSES TO PHQ QUESTIONS 1-9: 0
7. TROUBLE CONCENTRATING ON THINGS, SUCH AS READING THE NEWSPAPER OR WATCHING TELEVISION: 0
3. TROUBLE FALLING OR STAYING ASLEEP: 0
1. LITTLE INTEREST OR PLEASURE IN DOING THINGS: 0
8. MOVING OR SPEAKING SO SLOWLY THAT OTHER PEOPLE COULD HAVE NOTICED. OR THE OPPOSITE, BEING SO FIGETY OR RESTLESS THAT YOU HAVE BEEN MOVING AROUND A LOT MORE THAN USUAL: 0
SUM OF ALL RESPONSES TO PHQ9 QUESTIONS 1 & 2: 0
SUM OF ALL RESPONSES TO PHQ QUESTIONS 1-9: 0
9. THOUGHTS THAT YOU WOULD BE BETTER OFF DEAD, OR OF HURTING YOURSELF: 0
2. FEELING DOWN, DEPRESSED OR HOPELESS: 0

## 2023-07-13 ASSESSMENT — ANXIETY QUESTIONNAIRES
3. WORRYING TOO MUCH ABOUT DIFFERENT THINGS: 0
1. FEELING NERVOUS, ANXIOUS, OR ON EDGE: 0
7. FEELING AFRAID AS IF SOMETHING AWFUL MIGHT HAPPEN: 0
4. TROUBLE RELAXING: 0
2. NOT BEING ABLE TO STOP OR CONTROL WORRYING: 0
5. BEING SO RESTLESS THAT IT IS HARD TO SIT STILL: 0
IF YOU CHECKED OFF ANY PROBLEMS ON THIS QUESTIONNAIRE, HOW DIFFICULT HAVE THESE PROBLEMS MADE IT FOR YOU TO DO YOUR WORK, TAKE CARE OF THINGS AT HOME, OR GET ALONG WITH OTHER PEOPLE: NOT DIFFICULT AT ALL
6. BECOMING EASILY ANNOYED OR IRRITABLE: 0
GAD7 TOTAL SCORE: 0

## 2023-07-13 NOTE — PATIENT INSTRUCTIONS
Mira a blood pressure cuff to bring with you to your nurse visit at the end of the month. Following that visit, take blood pressure each morning and evening for 2 weeks - feet on the floor when you are calm and at least 1 hour after any caffeine (ideally before morning coffee).  Bring this log with you to your followup appointment

## 2023-07-13 NOTE — PROGRESS NOTES
Chief Complaint   Patient presents with    Annual Exam       1. \"Have you been to the ER, urgent care clinic since your last visit? Hospitalized since your last visit? \" yes - [facility]  2. \"Have you seen or consulted any other health care providers outside of the [address] [place] [address] since your last visit? \" yes - dentist  3. For patients aged 43-73: Has the patient had a colonoscopy / FIT/ Cologuard? Yes - Care Gap present. Most recent result on file             Financial Resource Strain: Low Risk     Difficulty of Paying Living Expenses: Not hard at all      Food Insecurity: No Food Insecurity    Worried About [place] in the Last Year: Never true    [address] in the Last Year: Never true        No flowsheet data found. No flowsheet data found.      PHQ-9  4/11/2023   Little interest or pleasure in doing things 0   Little interest or pleasure in doing things -   Feeling down, depressed, or hopeless 0   PHQ-2 Score 0   Total Score PHQ 2 -   PHQ-9 Total Score 0       Health Maintenance Due   Topic Date Due    HIV screen  Never done    Shingles vaccine (2 of 2) 02/13/2020    DTaP/Tdap/Td vaccine (2 - Td or Tdap) 10/27/2020    COVID-19 Vaccine (3 - Booster for Moderna series) 04/16/2021    Lipids  08/08/2023

## 2023-07-13 NOTE — PROGRESS NOTES
Mylene Schmidt  62 y.o. male  1965  [address] [facility]  [id number]     [address] [facility]       Chief Complaint: CPE, set for chronic conditions  Source: self     Subjective:   Mylene Schmidt is an 62 y.o. male who presents for complete physical exam.    HLD: on simvastatin 20mg daily, last , HDL 49, . Has been on it for about 10 years, never seen cardiology. No gamily hx of MI/CVA    Vit D Def: takes Vitamin D daily, 2000IU    Obesity: eats unch meat often on sandwiches for work but has changed to learn cuisine more often. Likes breads, not so much potatoes, drinks a reg soda maybe once a week. Cut    Elevated BP: denies daytime sleepiness, wife does say he snores but not sure how often nor to waht degree, states he has not been getting much exercise lately. No HA, chest pain, SOB      Allergies - reviewed:   No Known Allergies      Medications - reviewed:  Current Outpatient Medications   Medication Sig    Cholecalciferol 50 MCG (2000 UT) TABS Take 2 tablets by mouth    simvastatin (ZOCOR) 20 MG tablet Take 1 tablet by mouth daily    Multiple Vitamin (MULTI-VITAMIN DAILY PO) Take 1 tablet by mouth daily    Omega-3 1000 MG CAPS Take 4 capsules by mouth    albuterol sulfate HFA (PROVENTIL;VENTOLIN;PROAIR) 108 (90 Base) MCG/ACT inhaler INHALE TWO PUFFS BY MOUTH EVERY 4 TO 6 HOURS AS NEEDED FOR BREATHING (Patient not taking: Reported on 7/13/2023)     No current facility-administered medications for this visit.          Past Medical History - reviewed:  Past Medical History:   Diagnosis Date    COVID-19 7/6/2022 7/5/2022     Dyslipidemia, goal LDL below 130     Obesity (BMI 30.0-34.9) 6/17/2018         Past Surgical History - reviewed:  Past Surgical History:   Procedure Laterality Date    OTHER SURGICAL HISTORY  1998    anal fissure         Family History - reviewed:  Family History   Problem Relation Age of Onset    Cancer Mother 61        metastatic cancer to abd

## 2023-07-14 LAB
25(OH)D3+25(OH)D2 SERPL-MCNC: 62.9 NG/ML (ref 30–100)
ALBUMIN SERPL-MCNC: 4.7 G/DL (ref 3.8–4.9)
ALBUMIN/GLOB SERPL: 2 {RATIO} (ref 1.2–2.2)
ALP SERPL-CCNC: 66 IU/L (ref 44–121)
ALT SERPL-CCNC: 31 IU/L (ref 0–44)
AST SERPL-CCNC: 20 IU/L (ref 0–40)
BILIRUB SERPL-MCNC: 0.4 MG/DL (ref 0–1.2)
BUN SERPL-MCNC: 18 MG/DL (ref 6–24)
BUN/CREAT SERPL: 16 (ref 9–20)
CALCIUM SERPL-MCNC: 9 MG/DL (ref 8.7–10.2)
CHLORIDE SERPL-SCNC: 104 MMOL/L (ref 96–106)
CHOLEST SERPL-MCNC: 192 MG/DL (ref 100–199)
CO2 SERPL-SCNC: 20 MMOL/L (ref 20–29)
CREAT SERPL-MCNC: 1.11 MG/DL (ref 0.76–1.27)
EGFRCR SERPLBLD CKD-EPI 2021: 77 ML/MIN/1.73
ERYTHROCYTE [DISTWIDTH] IN BLOOD BY AUTOMATED COUNT: 12.9 % (ref 11.6–15.4)
GLOBULIN SER CALC-MCNC: 2.4 G/DL (ref 1.5–4.5)
GLUCOSE SERPL-MCNC: 106 MG/DL (ref 70–99)
HBA1C MFR BLD: 5.6 % (ref 4.8–5.6)
HCT VFR BLD AUTO: 47.2 % (ref 37.5–51)
HDLC SERPL-MCNC: 50 MG/DL
HGB BLD-MCNC: 15.3 G/DL (ref 13–17.7)
HIV 1+2 AB+HIV1 P24 AG SERPL QL IA: NON REACTIVE
IMP & REVIEW OF LAB RESULTS: NORMAL
LDLC SERPL CALC-MCNC: 111 MG/DL (ref 0–99)
MCH RBC QN AUTO: 28.6 PG (ref 26.6–33)
MCHC RBC AUTO-ENTMCNC: 32.4 G/DL (ref 31.5–35.7)
MCV RBC AUTO: 88 FL (ref 79–97)
PLATELET # BLD AUTO: 275 X10E3/UL (ref 150–450)
POTASSIUM SERPL-SCNC: 4.5 MMOL/L (ref 3.5–5.2)
PROT SERPL-MCNC: 7.1 G/DL (ref 6–8.5)
PSA SERPL-MCNC: 0.9 NG/ML (ref 0–4)
RBC # BLD AUTO: 5.35 X10E6/UL (ref 4.14–5.8)
SODIUM SERPL-SCNC: 139 MMOL/L (ref 134–144)
TRIGL SERPL-MCNC: 178 MG/DL (ref 0–149)
VLDLC SERPL CALC-MCNC: 31 MG/DL (ref 5–40)
WBC # BLD AUTO: 5.7 X10E3/UL (ref 3.4–10.8)

## 2023-07-27 ENCOUNTER — NURSE ONLY (OUTPATIENT)
Age: 58
End: 2023-07-27

## 2023-07-27 VITALS — DIASTOLIC BLOOD PRESSURE: 84 MMHG | SYSTOLIC BLOOD PRESSURE: 125 MMHG

## 2023-07-27 RX ORDER — SIMVASTATIN 20 MG
20 TABLET ORAL DAILY
Qty: 90 TABLET | Refills: 1 | Status: SHIPPED | OUTPATIENT
Start: 2023-07-27

## 2023-07-27 NOTE — PROGRESS NOTES
Patient here for b/p check  Very upset about bills they got related to visit. Says they were here just for a physical and labs were not billed as a physical but were billed related to conditions patient has. Also they were billed for a separate visit as well. Patient advises b/p was elevated at visit but this was found at visit so he should not receive a separate charge with things found at visit. Advises he came in 04/11/2023 for his routine visit so all of this routine follow up problems were addressed then and should have not been a part of his physical.   Wants code changed for labs to only show for physical. Wants bill for additional visit with CPE removed. Needs Refill on Simvastatin  Wants 90 day   [facility]       Patient request to have Manager's name and Number. Provided information as requested.

## 2023-07-27 NOTE — TELEPHONE ENCOUNTER
Pt wife came to check out and wanted contact information for Sierra Jimenez. Inform pt we didn't have any contact information and that we can take down pt name and phone# and will let Jewels know. Pt wife stated pt got a bill for CPE that was over $1300 and wanted to know why. Pt wife did not want to call billing department and only wants to speak to [facility]. Pt wife is very upset and requesting call back from [facility] asap.  Pt wife best callback# [phone] 7/27/23